# Patient Record
Sex: MALE | Race: BLACK OR AFRICAN AMERICAN | Employment: UNEMPLOYED | URBAN - METROPOLITAN AREA
[De-identification: names, ages, dates, MRNs, and addresses within clinical notes are randomized per-mention and may not be internally consistent; named-entity substitution may affect disease eponyms.]

---

## 2017-01-05 ENCOUNTER — OFFICE VISIT (OUTPATIENT)
Dept: CARDIOLOGY CLINIC | Age: 77
End: 2017-01-05

## 2017-01-05 VITALS
HEART RATE: 96 BPM | OXYGEN SATURATION: 97 % | DIASTOLIC BLOOD PRESSURE: 70 MMHG | SYSTOLIC BLOOD PRESSURE: 107 MMHG | BODY MASS INDEX: 25.12 KG/M2 | WEIGHT: 169.6 LBS | HEIGHT: 69 IN

## 2017-01-05 DIAGNOSIS — I10 ESSENTIAL HYPERTENSION: ICD-10-CM

## 2017-01-05 DIAGNOSIS — I50.810 RIGHT-SIDED HEART FAILURE (HCC): Primary | ICD-10-CM

## 2017-01-05 DIAGNOSIS — R06.02 SOB (SHORTNESS OF BREATH): ICD-10-CM

## 2017-01-05 DIAGNOSIS — I27.20 PULMONARY HTN (HCC): ICD-10-CM

## 2017-01-05 RX ORDER — CLOPIDOGREL BISULFATE 75 MG/1
TABLET ORAL
Refills: 1 | COMMUNITY
Start: 2016-10-17 | End: 2017-01-10

## 2017-01-05 RX ORDER — CARVEDILOL 3.12 MG/1
TABLET ORAL 2 TIMES DAILY WITH MEALS
COMMUNITY
End: 2017-01-10

## 2017-01-05 RX ORDER — GUAIFENESIN 600 MG/1
TABLET, EXTENDED RELEASE ORAL
Refills: 0 | COMMUNITY
Start: 2016-12-22 | End: 2017-01-10 | Stop reason: CLARIF

## 2017-01-05 RX ORDER — ATORVASTATIN CALCIUM 40 MG/1
TABLET, FILM COATED ORAL
Refills: 1 | COMMUNITY
Start: 2016-10-31 | End: 2017-01-10 | Stop reason: CLARIF

## 2017-01-05 RX ORDER — BISMUTH SUBSALICYLATE 262 MG
1 TABLET,CHEWABLE ORAL DAILY
COMMUNITY

## 2017-01-05 RX ORDER — ALLOPURINOL 100 MG/1
TABLET ORAL
Refills: 2 | COMMUNITY
Start: 2016-10-31 | End: 2017-01-10

## 2017-01-05 NOTE — Clinical Note
Jatin Rosen, we sent Mr Jasen Cummings to [facility] today to see Dr. Domingo Blackmon who is a specialist in pulmonary hypertension. We really haven't seen much improvement in Mr. Ev Mcguire dyspnea in the past few visits. I had ordered a chest XR last month, which was normal, and advised a pulmonary consultation, which he has arranged for February. However, he reports a recent hospitalization at [facility] this week -- was discharged yesterday. He reports he had significant swelling in his abdomen; he estimates that he lost 16 lbs with diuresis in the hospital. But never had fluid in his legs or lungs. This is probably all right sided heart failure with pulmonary HTN. His last echo showed PA pressure of 38 mmHg which was deemed an underestimate. We are getting [facility] records and will scan them into the chart. We will keep you informed of what we hear from Dr. Domingo Blackmon. Happy New Year!  Tano Poon PA-C

## 2017-01-05 NOTE — MR AVS SNAPSHOT
Visit Information Date & Time Provider Department Dept. Phone Encounter #  
 1/5/2017 11:00 AM Edilia Lockwood MD [facility] [phone] [id number] Your Appointments 1/10/2017  1:20 PM  
ROUTINE CARE with Darrius Rojas NP  
[address] [facility] [address] [facility]) Appt Note: F/U  
 [address] [place] [address] [place] [address]  
[phone]  
  
   
 [address] [place]  
  
    
 10/4/2017  9:40 AM  
Follow Up with Ronell Bumpers, MD  
[facility] at [facility] [address] [facility]) Appt Note: Follow up $0CP tdb 10/4/16  
 [facility], 
[address] [place] [address], [address] [phone]  
[address], [address] [place] [address], [address] [phone] Upcoming Health Maintenance Date Due Pneumococcal 65+ Low/Medium Risk (1 of 2 - PCV13) 12/22/2006 INFLUENZA AGE 9 TO ADULT 8/1/2016 MEDICARE YEARLY EXAM 11/30/2016 GLAUCOMA SCREENING Q2Y 12/1/2016 COLONOSCOPY 9/9/2020 DTaP/Tdap/Td series (2 - Td) 6/14/2026 Allergies as of 1/5/2017  Review Complete On: 1/5/2017 By: Carole Snider PA-C No Known Allergies Current Immunizations  Reviewed on 9/2/2016 Name Date Influenza High Dose Vaccine PF 12/1/2014 Influenza Vaccine 10/24/2013 Tdap 6/14/2016 Not reviewed this visit You Were Diagnosed With   
  
 Codes Comments Right-sided heart failure ([facility]    -  Primary ICD-10-CM: I50.9 ICD-9-CM: 428.0 SOB (shortness of breath)     ICD-10-CM: R06.02 
ICD-9-CM: 786.05   
 Pulmonary HTN ([facility]     ICD-10-CM: I27.2 ICD-9-CM: 416.8 Essential hypertension     ICD-10-CM: I10 
ICD-9-CM: 401.9 Vitals BP Pulse Height(growth percentile) Weight(growth percentile) SpO2 BMI  
 107/70 96 5' 9\" (1.753 m) 169 lb 9.6 oz (76.9 kg) 97% 25.05 kg/m2 Smoking Status Former Smoker Vitals History BMI and BSA Data Body Mass Index Body Surface Area 25.05 kg/m 2 1.93 m 2 Preferred Pharmacy Pharmacy Name Phone Orly Hendrix [facility], [address] [facility] [phone] Your Updated Medication List  
  
   
This list is accurate as of: 1/5/17 12:23 PM.  Always use your most recent med list.  
  
  
  
  
 acetaminophen 650 mg CR tablet Commonly known as:  TYLENOL ARTHRITIS PAIN Take 1 Tab by mouth daily. Indications: ARTHRITIC PAIN  
  
 allopurinol 100 mg tablet Commonly known as:  Lloyd Needle TK 1 T PO D  
  
 aspirin delayed-release 81 mg tablet TK 1 T PO  QD  
  
 atorvastatin 40 mg tablet Commonly known as:  LIPITOR TK 1 T PO NIGHTLY  
  
 carvedilol 3.125 mg tablet Commonly known as:  Layman Andersen Take  by mouth two (2) times daily (with meals). clopidogrel 75 mg Tab Commonly known as:  PLAVIX TK 1 T PO D  
  
 clotrimazole-betamethasone topical cream  
Commonly known as:  Mandi Mole Apply  to affected area two (2) times a day. Apply to affected area bid/ generic formula  
  
 diclofenac 1 % Gel Commonly known as:  VOLTAREN Apply 4 g to affected area four (4) times daily. furosemide 40 mg tablet Commonly known as:  LASIX  
  
 losartan 50 mg tablet Commonly known as:  COZAAR Take 1 Tab by mouth daily. MUCINEX 600 mg ER tablet Generic drug:  guaiFENesin ER TK 1 T PO BID  
  
 multivitamin tablet Commonly known as:  ONE A DAY Take 1 Tab by mouth daily. omeprazole 40 mg capsule Commonly known as:  PRILOSEC Take 1 Cap by mouth daily. potassium chloride 10 mEq tablet Commonly known as:  K-DUR, KLOR-CON Take 1 Tab by mouth daily. PROAIR HFA IN Take  by inhalation. 2 PUFFS PRN  
  
 tamsulosin 0.4 mg capsule Commonly known as:  FLOMAX TAKE ONE CAPSULE BY MOUTH DAILY Patient Instructions -- We advise a right heart catheterization by a specialist in pulmonary hypertension -- Dr. Niles Borja at [address] [facility] will contact you with instructions -- Please keep us informed as needed Pulmonary Hypertension: Care Instructions Your Care Instructions Pulmonary hypertension is high blood pressure in the arteries of your lungs. These blood vessels carry blood from the heart to the lungs, where the blood picks up oxygen. The walls of the arteries may get thick, and the arteries may get narrow. When this happens, blood does not flow as well as it should. Pressure builds up in the arteries. Then your heart has to work harder to pump blood through your lungs. There are different types of pulmonary hypertension. They are caused by different things. Causes include other health conditions such as heart or lung problems. Sometimes it can happen without a known cause. When you have this condition, your body gets less oxygen from your blood. This causes symptoms such as shortness of breath and feeling tired, faint, or dizzy. Over time, these symptoms may change or get worse if your heart gets weaker. You may get heart failure. Heart failure means your heart doesn't pump as much blood as your body needs. Treatment can help you feel better and live longer. Your treatment options will depend on the type of pulmonary hypertension you have. It can be hard to learn that you have a problem with your lungs and heart. But there are things you can do to feel better and stay as active as you can. Follow-up care is a key part of your treatment and safety. Be sure to make and go to all appointments, and call your doctor if you are having problems. It's also a good idea to know your test results and keep a list of the medicines you take. How can you care for yourself at home? Medicine · Be safe with medicines. Take your medicines exactly as prescribed. Call your doctor if you think you are having a problem with your medicine.  You will get more details on the specific medicines your doctor prescribes. · Your doctor may prescribe oxygen therapy. You will get more details on how to use it. · Talk to your doctor before you take any vitamins, over-the-counter medicine, or herbal products. Don't take ibuprofen (Advil or Motrin) and naproxen (Aleve) without talking to your doctor first. 
· If you take a blood thinner, be sure to get instructions about how to take your medicine safely. Blood thinners can cause serious bleeding problems. Activity · Be as active as you can. Talk to your doctor about making a plan before you start a new activity. · Ask your doctor if a pulmonary rehabilitation program is right for you. · Learn how to save your energy. Making small changes in daily activities can make a big impact on how you feel. Staying healthy · Eat healthy foods, and try to stay at a healthy weight. · Do not smoke. Smoking can make this condition worse. If you need help quitting, talk to your doctor about stop-smoking programs and medicines. These can increase your chances of quitting for good. · Talk to your doctor about preventing pregnancy. You may need to take steps to avoid becoming pregnant. Pregnancy and childbirth can cause changes in the body that could be life-threatening for women who have this condition. · Avoid colds and flu. Get a pneumococcal vaccine shot. If you have had one before, ask your doctor if you need another dose. Get a flu vaccine every year. If you must be around people with colds or flu, wash your hands often. When should you call for help? Call 911 anytime you think you may need emergency care. For example, call if: 
· You have symptoms of sudden heart failure. These may include: ¨ Severe trouble breathing. ¨ A fast or irregular heartbeat. ¨ Coughing up pink, foamy mucus. ¨ Passing out. Call your doctor now or seek immediate medical care if: · You have new or changed symptoms of heart failure, such as: ¨ New or increased shortness of breath. ¨ New or worse swelling in your legs, ankles, or feet. ¨ Sudden weight gain, such as 3 pounds or more in 2 to 3 days. (Your doctor may suggest a different range of weight gain.) ¨ Feeling dizzy or lightheaded or like you may faint. ¨ Feeling so tired or weak that you cannot do your usual activities. ¨ Not sleeping well. Shortness of breath wakes you at night. You need extra pillows to prop yourself up to breathe easier. Watch closely for changes in your health, and be sure to contact your doctor if: 
· You have new or worse symptoms. Where can you learn more? Go to http://jose-bernadine.info/. Enter O743 in the search box to learn more about \"Pulmonary Hypertension: Care Instructions. \" Current as of: January 27, 2016 Content Version: 11.1 © 7851-2294 [employer]. Care instructions adapted under license by [employer] (which disclaims liability or warranty for this information). If you have questions about a medical condition or this instruction, always ask your healthcare professional. [place] [address] any warranty or liability for your use of this information. Introducing [facility]! Ha Singh introduces RealSelf patient portal. Now you can access parts of your medical record, email your doctor's office, and request medication refills online. 1. In your internet browser, go to [email or web address] 2. Click on the First Time User? Click Here link in the Sign In box. You will see the New Member Sign Up page. 3. Enter your RealSelf Access Code exactly as it appears below. You will not need to use this code after youve completed the sign-up process. If you do not sign up before the expiration date, you must request a new code. · RealSelf Access Code: [id number] Expires: 1/23/2017  9:52 AM 
 
 4. Enter the last four digits of your Social Security Number (xxxx) and Date of Birth (mm/dd/yyyy) as indicated and click Submit. You will be taken to the next sign-up page. 5. Create a Ontela ID. This will be your Ontela login ID and cannot be changed, so think of one that is secure and easy to remember. 6. Create a Ontela password. You can change your password at any time. 7. Enter your Password Reset Question and Answer. This can be used at a later time if you forget your password. 8. Enter your e-mail address. You will receive e-mail notification when new information is available in [address] 9. Click Sign Up. You can now view and download portions of your medical record. 10. Click the Download Summary menu link to download a portable copy of your medical information. If you have questions, please visit the Frequently Asked Questions section of the Ontela website. Remember, Ontela is NOT to be used for urgent needs. For medical emergencies, dial 911. Now available from your iPhone and Android! Please provide this summary of care documentation to your next provider. Your primary care clinician is listed as CLAIRE Mueller. If you have any questions after today's visit, please call [phone].

## 2017-01-05 NOTE — PROGRESS NOTES
[facility]   [address] [facility], [address] [place] [address]                                          NEW PATIENT HPI/FOLLOW-UP      NAME:  Quynh Gee   :   1940   MRN:   [id number]   PCP:  Gisella Sullivan NP           Subjective: The patient is a 77y.o. year old male  who returns for a routine follow-up with no change in sx. Since the last visit, patient reports continued SOB despite a 3 day hospital stay at [facility]. Pt was discharged yesterday, 2017. Pt reports a 16-lb weight loss due to fluid in his abdomen that was diuresed during hospital stay. He reports RUQ pain during his stay, which has improved. He denies fluid in LE or lungs. He has a normal chest XR after his last OV with us on 16. Pt states he is continuously fatigued. He cannot attend to his daily activities because of SOB and fatigue. Denies chest pain, edema, medication intolerance, palpitations, PND/orthopnea wheezing, sputum, syncope, dizziness or light headedness. He has made appointment with Pulmonary, but is not able to get in there until February. Review of Systems  General: Pt denies excessive weight gain or loss. Pt is able to conduct ADL's. Respiratory: +shortness of breath, +MCCALLUM, denies wheezing or stridor.   Cardiovascular: Denies precordial pain, palpitations, edema or PND  Gastrointestinal: Denies poor appetite, indigestion, +abdominal pain   Peripheral vascular: Denies claudication, leg cramps  Neuropsychiatric: Denies paresthesias,tingling,numbness,anxiety,depression,fatigue  Musculoskeletal: Denies pain,tenderness, soreness,swelling      Past Medical History   Diagnosis Date    Arthritis     CHF (congestive heart failure) ([facility]     Dizziness 3/3/2011    GERD (gastroesophageal reflux disease)     Hypercholesterolemia     Hypertension     ICD (implantable cardiac defibrillator), single, in situ 3/3/2011    Rectus diastasis 2014    Stroke [facility])      Patient Active Problem List    Diagnosis Date Noted    Stenosis of both internal carotid arteries 10/04/2016    Cerebrovascular accident (CVA) due to occlusion of right middle cerebral artery ([facility] 09/02/2016    Upper GI bleed 03/30/2016    Rectus diastasis 12/11/2014    Anxiety 10/24/2013    Anemia 03/03/2011    Chest discomfort 03/03/2011    Pacemaker 03/03/2011    Dizziness 03/03/2011    Bronchospasm 11/16/2010    CHF (congestive heart failure) ([facility] 08/09/2010    HTN (hypertension) 08/09/2010    SOB (shortness of breath) 08/09/2010    PPD positive 08/09/2010      Past Surgical History   Procedure Laterality Date    Hx orthopaedic       left knee rep 2005, rt kneerepl 2006    Pr cardiac surg procedure unlist       defibulator 2009    Pr egd balloon dilation esophagus <30 mm diam  10/5/2012          Upper gi endoscopy,biopsy  3/31/2016          Colonoscopy,remv lesn,snare  4/1/2016           No Known Allergies   Family History   Problem Relation Age of Onset    Heart Disease Mother     Heart Disease Sister       Social History     Social History    Marital status:      Spouse name: N/A    Number of children: N/A    Years of education: N/A     Occupational History    Not on file. Social History Main Topics    Smoking status: Former Smoker    Smokeless tobacco: Never Used    Alcohol use 1.5 oz/week     3 Cans of beer per week    Drug use: Yes     Special: Marijuana      Comment: twice weekly    Sexual activity: Not on file     Other Topics Concern    Not on file     Social History Narrative    4/2016: lives with son and two grandchildren. Did 1 year of college. Used to work as . Current Outpatient Prescriptions   Medication Sig    carvedilol (COREG) 3.125 mg tablet Take  by mouth two (2) times daily (with meals).  multivitamin (ONE A DAY) tablet Take 1 Tab by mouth daily.  ALBUTEROL SULFATE (PROAIR HFA IN) Take  by inhalation.  2 PUFFS PRN    furosemide (LASIX) 40 mg tablet     aspirin delayed-release 81 mg tablet TK 1 T PO  QD    losartan (COZAAR) 50 mg tablet Take 1 Tab by mouth daily. (Patient taking differently: Take 25 mg by mouth daily. 1/2 TABLET)    diclofenac (VOLTAREN) 1 % gel Apply 4 g to affected area four (4) times daily.  tamsulosin (FLOMAX) 0.4 mg capsule TAKE ONE CAPSULE BY MOUTH DAILY    omeprazole (PRILOSEC) 40 mg capsule Take 1 Cap by mouth daily.  acetaminophen (TYLENOL ARTHRITIS PAIN) 650 mg CR tablet Take 1 Tab by mouth daily. Indications: ARTHRITIC PAIN    clotrimazole-betamethasone (LOTRISONE) topical cream Apply  to affected area two (2) times a day. Apply to affected area bid/ generic formula (Patient taking differently: Apply  to affected area as needed. Apply to affected area bid/ generic formula)    potassium chloride (K-DUR, KLOR-CON) 10 mEq tablet Take 1 Tab by mouth daily.  allopurinol (ZYLOPRIM) 100 mg tablet TK 1 T PO D    atorvastatin (LIPITOR) 40 mg tablet TK 1 T PO NIGHTLY    clopidogrel (PLAVIX) 75 mg tab TK 1 T PO D    MUCINEX 600 mg ER tablet TK 1 T PO BID     No current facility-administered medications for this visit. I have reviewed the MAs notes, vitals, problem list, allergy list, medical history, family medical, social history and medications. Objective:     Physical Exam:     Vitals:    01/05/17 1126   BP: 107/70   Pulse: 96   SpO2: 97%   Weight: 169 lb 9.6 oz (76.9 kg)   Height: 5' 9\" (1.753 m)    Body mass index is 25.05 kg/(m^2). General: WDWN, in no acute distress. HEENT: No carotid bruits, no JVD, trach is midline. Heart:  Normal S1/S2 negative S3 or S4. Regular, no murmur, gallop or rub.   Respiratory: Clear bilaterally, no wheezing or rales. No tachypnea. Abdomen:   Soft, non-tender, bowel sounds are active. Palpable liver margin. Extremities:  No edema, normal cap refill, no cyanosis. Neuro: A&Ox3, speech clear, gait stable.    Skin: Skin color is normal. No rashes or lesions. No diaphoresis. Vascular: 2+ pulses symmetric in all extremities      Data Review:       Cardiographics:    Cardiology Labs: reviewed    Results for orders placed or performed during the hospital encounter of 09/02/16   EKG, 12 LEAD, INITIAL   Result Value Ref Range    Ventricular Rate 81 BPM    Atrial Rate 81 BPM    P-R Interval 140 ms    QRS Duration 100 ms    Q-T Interval 394 ms    QTC Calculation (Bezet) 457 ms    Calculated P Axis 59 degrees    Calculated R Axis -38 degrees    Calculated T Axis 28 degrees    Diagnosis       Sinus rhythm with occasional premature ventricular complexes  Possible Left atrial enlargement  Left axis deviation  T wave abnormality, consider lateral ischemia  Inferior infarct (cited on or before 03-SEP-2016)  When compared with ECG of 22-MAY-2016 18:06,  premature supraventricular complexes are no longer present  T wave abnormality, consider lateral ischemia is a new finding  Confirmed by Aurelia Gaspar ([id number]) on 9/4/2016 2:05:55 PM         Lab Results   Component Value Date/Time    Cholesterol, total 126 09/03/2016 05:13 AM    HDL Cholesterol 44 09/03/2016 05:13 AM    LDL, calculated 68 09/03/2016 05:13 AM    Triglyceride 70 09/03/2016 05:13 AM    CHOL/HDL Ratio 2.9 09/03/2016 05:13 AM       Lab Results   Component Value Date/Time    Sodium 138 09/05/2016 12:57 AM    Potassium 4.0 09/05/2016 12:57 AM    Chloride 103 09/05/2016 12:57 AM    CO2 27 09/05/2016 12:57 AM    Anion gap 8 09/05/2016 12:57 AM    Glucose 89 09/05/2016 12:57 AM    BUN 16 09/05/2016 12:57 AM    Creatinine 1.18 09/05/2016 12:57 AM    BUN/Creatinine ratio 14 09/05/2016 12:57 AM    GFR est AA >60 09/05/2016 12:57 AM    GFR est non-AA >60 09/05/2016 12:57 AM    Calcium 8.7 09/05/2016 12:57 AM    Bilirubin, total 0.5 09/05/2016 12:57 AM    ALT 16 09/05/2016 12:57 AM    AST 13 09/05/2016 12:57 AM    Alk.  phosphatase 77 09/05/2016 12:57 AM    Protein, total 7.4 09/05/2016 12:57 AM    Albumin 3.5 09/05/2016 12:57 AM    Globulin 3.9 09/05/2016 12:57 AM    A-G Ratio 0.9 09/05/2016 12:57 AM          Assessment:       ICD-10-CM ICD-9-CM    1. Right-sided heart failure (HCC) I50.9 428.0    2. SOB (shortness of breath) R06.02 786.05    3. Pulmonary HTN (HCC) I27.2 416.8    4. Essential hypertension I10 401.9          Discussion: Patient presents at this with probable right sided heart failure, pulmonary hypertension, SOB and fatigue despite recent hospitalization. Attending to Attending consultation between Dr. Rosen and Dr. Ghada Perez Pulmonary HTN specialist regarding pt's status. Dr. Peña Sotelo has agreed to contact patient for next steps and will see pt today. Reviewed plan with pt who is in agreement. Outside medical records requested and reviewed. Plan:   Discussed with Dr. Rosen    1. Continue same meds. 2.Encouraged to follow up with Dr. Peña Sotelo    3. Follow up: to be determined after evaluation by Dr. Peña Sotelo    I have discussed the diagnosis with the patient and the intended plan as seen in the above orders. The patient has received an after-visit summary and questions were answered concerning future plans. I have discussed any concerning medication side effects and warnings with the patient as well.     Matias Valencia PA-C  1/5/2017

## 2017-01-05 NOTE — PATIENT INSTRUCTIONS
-- We advise a right heart catheterization by a specialist in pulmonary hypertension  -- Dr. Frederick Negrete at [facility] will contact you with instructions  -- Please keep us informed as needed     Pulmonary Hypertension: Care Instructions  Your Care Instructions  Pulmonary hypertension is high blood pressure in the arteries of your lungs. These blood vessels carry blood from the heart to the lungs, where the blood picks up oxygen. The walls of the arteries may get thick, and the arteries may get narrow. When this happens, blood does not flow as well as it should. Pressure builds up in the arteries. Then your heart has to work harder to pump blood through your lungs. There are different types of pulmonary hypertension. They are caused by different things. Causes include other health conditions such as heart or lung problems. Sometimes it can happen without a known cause. When you have this condition, your body gets less oxygen from your blood. This causes symptoms such as shortness of breath and feeling tired, faint, or dizzy. Over time, these symptoms may change or get worse if your heart gets weaker. You may get heart failure. Heart failure means your heart doesn't pump as much blood as your body needs. Treatment can help you feel better and live longer. Your treatment options will depend on the type of pulmonary hypertension you have. It can be hard to learn that you have a problem with your lungs and heart. But there are things you can do to feel better and stay as active as you can. Follow-up care is a key part of your treatment and safety. Be sure to make and go to all appointments, and call your doctor if you are having problems. It's also a good idea to know your test results and keep a list of the medicines you take. How can you care for yourself at home? Medicine  · Be safe with medicines. Take your medicines exactly as prescribed. Call your doctor if you think you are having a problem with your medicine.  You will get more details on the specific medicines your doctor prescribes. · Your doctor may prescribe oxygen therapy. You will get more details on how to use it. · Talk to your doctor before you take any vitamins, over-the-counter medicine, or herbal products. Don't take ibuprofen (Advil or Motrin) and naproxen (Aleve) without talking to your doctor first.  · If you take a blood thinner, be sure to get instructions about how to take your medicine safely. Blood thinners can cause serious bleeding problems. Activity  · Be as active as you can. Talk to your doctor about making a plan before you start a new activity. · Ask your doctor if a pulmonary rehabilitation program is right for you. · Learn how to save your energy. Making small changes in daily activities can make a big impact on how you feel. Staying healthy  · Eat healthy foods, and try to stay at a healthy weight. · Do not smoke. Smoking can make this condition worse. If you need help quitting, talk to your doctor about stop-smoking programs and medicines. These can increase your chances of quitting for good. · Talk to your doctor about preventing pregnancy. You may need to take steps to avoid becoming pregnant. Pregnancy and childbirth can cause changes in the body that could be life-threatening for women who have this condition. · Avoid colds and flu. Get a pneumococcal vaccine shot. If you have had one before, ask your doctor if you need another dose. Get a flu vaccine every year. If you must be around people with colds or flu, wash your hands often. When should you call for help? Call 911 anytime you think you may need emergency care. For example, call if:  · You have symptoms of sudden heart failure. These may include:  ¨ Severe trouble breathing. ¨ A fast or irregular heartbeat. ¨ Coughing up pink, foamy mucus. ¨ Passing out.   Call your doctor now or seek immediate medical care if:  · You have new or changed symptoms of heart failure, such asRoxine Cook New or increased shortness of breath. ¨ New or worse swelling in your legs, ankles, or feet. ¨ Sudden weight gain, such as 3 pounds or more in 2 to 3 days. (Your doctor may suggest a different range of weight gain.)  ¨ Feeling dizzy or lightheaded or like you may faint. ¨ Feeling so tired or weak that you cannot do your usual activities. ¨ Not sleeping well. Shortness of breath wakes you at night. You need extra pillows to prop yourself up to breathe easier. Watch closely for changes in your health, and be sure to contact your doctor if:  · You have new or worse symptoms. Where can you learn more? Go to http://jose-bernadine.info/. Enter O770 in the search box to learn more about \"Pulmonary Hypertension: Care Instructions. \"  Current as of: January 27, 2016  Content Version: 11.1  © 3561-6270 [employer]. Care instructions adapted under license by [employer] (which disclaims liability or warranty for this information). If you have questions about a medical condition or this instruction, always ask your healthcare professional. [place] [address] any warranty or liability for your use of this information.

## 2017-01-10 ENCOUNTER — APPOINTMENT (OUTPATIENT)
Dept: GENERAL RADIOLOGY | Age: 77
DRG: 292 | End: 2017-01-10
Attending: PHYSICIAN ASSISTANT
Payer: MEDICARE

## 2017-01-10 ENCOUNTER — OFFICE VISIT (OUTPATIENT)
Dept: INTERNAL MEDICINE CLINIC | Age: 77
End: 2017-01-10

## 2017-01-10 ENCOUNTER — HOSPITAL ENCOUNTER (INPATIENT)
Age: 77
LOS: 1 days | Discharge: HOME | DRG: 292 | End: 2017-01-12
Attending: EMERGENCY MEDICINE | Admitting: STUDENT IN AN ORGANIZED HEALTH CARE EDUCATION/TRAINING PROGRAM
Payer: MEDICARE

## 2017-01-10 ENCOUNTER — TELEPHONE (OUTPATIENT)
Dept: CARDIOLOGY CLINIC | Age: 77
End: 2017-01-10

## 2017-01-10 VITALS
DIASTOLIC BLOOD PRESSURE: 63 MMHG | WEIGHT: 171.4 LBS | HEART RATE: 88 BPM | TEMPERATURE: 97.9 F | BODY MASS INDEX: 25.39 KG/M2 | RESPIRATION RATE: 18 BRPM | SYSTOLIC BLOOD PRESSURE: 85 MMHG | HEIGHT: 69 IN | OXYGEN SATURATION: 96 %

## 2017-01-10 DIAGNOSIS — I95.9 HYPOTENSION, UNSPECIFIED HYPOTENSION TYPE: Primary | ICD-10-CM

## 2017-01-10 DIAGNOSIS — E86.0 DEHYDRATION: ICD-10-CM

## 2017-01-10 DIAGNOSIS — I27.20 PULMONARY HTN (HCC): ICD-10-CM

## 2017-01-10 DIAGNOSIS — R06.02 SOB (SHORTNESS OF BREATH): ICD-10-CM

## 2017-01-10 DIAGNOSIS — K11.7 XEROSTOMIA: ICD-10-CM

## 2017-01-10 LAB
ALBUMIN SERPL BCP-MCNC: 3.5 G/DL (ref 3.5–5)
ALBUMIN/GLOB SERPL: 0.9 {RATIO} (ref 1.1–2.2)
ALP SERPL-CCNC: 68 U/L (ref 45–117)
ALT SERPL-CCNC: 25 U/L (ref 12–78)
ANION GAP BLD CALC-SCNC: 8 MMOL/L (ref 5–15)
AST SERPL W P-5'-P-CCNC: 20 U/L (ref 15–37)
ATRIAL RATE: 87 BPM
BASOPHILS # BLD AUTO: 0 K/UL (ref 0–0.1)
BASOPHILS # BLD: 0 % (ref 0–1)
BILIRUB SERPL-MCNC: 0.6 MG/DL (ref 0.2–1)
BNP SERPL-MCNC: 1857 PG/ML (ref 0–450)
BUN SERPL-MCNC: 24 MG/DL (ref 6–20)
BUN/CREAT SERPL: 16 (ref 12–20)
CALCIUM SERPL-MCNC: 8.5 MG/DL (ref 8.5–10.1)
CALCULATED P AXIS, ECG09: 50 DEGREES
CALCULATED R AXIS, ECG10: -42 DEGREES
CALCULATED T AXIS, ECG11: 103 DEGREES
CHLORIDE SERPL-SCNC: 107 MMOL/L (ref 97–108)
CO2 SERPL-SCNC: 28 MMOL/L (ref 21–32)
CREAT SERPL-MCNC: 1.46 MG/DL (ref 0.7–1.3)
DIAGNOSIS, 93000: NORMAL
EOSINOPHIL # BLD: 0.2 K/UL (ref 0–0.4)
EOSINOPHIL NFR BLD: 4 % (ref 0–7)
ERYTHROCYTE [DISTWIDTH] IN BLOOD BY AUTOMATED COUNT: 14.3 % (ref 11.5–14.5)
GLOBULIN SER CALC-MCNC: 3.7 G/DL (ref 2–4)
GLUCOSE SERPL-MCNC: 103 MG/DL (ref 65–100)
HCT VFR BLD AUTO: 37.7 % (ref 36.6–50.3)
HGB BLD-MCNC: 12.3 G/DL (ref 12.1–17)
LYMPHOCYTES # BLD AUTO: 50 % (ref 12–49)
LYMPHOCYTES # BLD: 2.6 K/UL (ref 0.8–3.5)
MCH RBC QN AUTO: 30.4 PG (ref 26–34)
MCHC RBC AUTO-ENTMCNC: 32.6 G/DL (ref 30–36.5)
MCV RBC AUTO: 93.3 FL (ref 80–99)
MONOCYTES # BLD: 0.7 K/UL (ref 0–1)
MONOCYTES NFR BLD AUTO: 13 % (ref 5–13)
NEUTS SEG # BLD: 1.7 K/UL (ref 1.8–8)
NEUTS SEG NFR BLD AUTO: 33 % (ref 32–75)
P-R INTERVAL, ECG05: 142 MS
PLATELET # BLD AUTO: 219 K/UL (ref 150–400)
POTASSIUM SERPL-SCNC: 4.4 MMOL/L (ref 3.5–5.1)
PROT SERPL-MCNC: 7.2 G/DL (ref 6.4–8.2)
Q-T INTERVAL, ECG07: 404 MS
QRS DURATION, ECG06: 108 MS
QTC CALCULATION (BEZET), ECG08: 486 MS
RBC # BLD AUTO: 4.04 M/UL (ref 4.1–5.7)
SODIUM SERPL-SCNC: 143 MMOL/L (ref 136–145)
VENTRICULAR RATE, ECG03: 87 BPM
WBC # BLD AUTO: 5.1 K/UL (ref 4.1–11.1)

## 2017-01-10 PROCEDURE — 74011250637 HC RX REV CODE- 250/637: Performed by: INTERNAL MEDICINE

## 2017-01-10 PROCEDURE — 99218 HC RM OBSERVATION: CPT

## 2017-01-10 PROCEDURE — 36415 COLL VENOUS BLD VENIPUNCTURE: CPT | Performed by: PHYSICIAN ASSISTANT

## 2017-01-10 PROCEDURE — 85025 COMPLETE CBC W/AUTO DIFF WBC: CPT | Performed by: PHYSICIAN ASSISTANT

## 2017-01-10 PROCEDURE — 71020 XR CHEST PA LAT: CPT

## 2017-01-10 PROCEDURE — 99285 EMERGENCY DEPT VISIT HI MDM: CPT

## 2017-01-10 PROCEDURE — 74011250636 HC RX REV CODE- 250/636: Performed by: PHYSICIAN ASSISTANT

## 2017-01-10 PROCEDURE — 96360 HYDRATION IV INFUSION INIT: CPT

## 2017-01-10 PROCEDURE — 83880 ASSAY OF NATRIURETIC PEPTIDE: CPT | Performed by: PHYSICIAN ASSISTANT

## 2017-01-10 PROCEDURE — 93005 ELECTROCARDIOGRAM TRACING: CPT

## 2017-01-10 PROCEDURE — 74011250636 HC RX REV CODE- 250/636: Performed by: INTERNAL MEDICINE

## 2017-01-10 PROCEDURE — 80053 COMPREHEN METABOLIC PANEL: CPT | Performed by: PHYSICIAN ASSISTANT

## 2017-01-10 RX ORDER — FUROSEMIDE 40 MG/1
40 TABLET ORAL 2 TIMES DAILY
COMMUNITY

## 2017-01-10 RX ORDER — SODIUM CHLORIDE 0.9 % (FLUSH) 0.9 %
5-10 SYRINGE (ML) INJECTION EVERY 8 HOURS
Status: DISCONTINUED | OUTPATIENT
Start: 2017-01-10 | End: 2017-01-12 | Stop reason: HOSPADM

## 2017-01-10 RX ORDER — PANTOPRAZOLE SODIUM 40 MG/1
40 TABLET, DELAYED RELEASE ORAL
Status: DISCONTINUED | OUTPATIENT
Start: 2017-01-10 | End: 2017-01-12 | Stop reason: HOSPADM

## 2017-01-10 RX ORDER — GUAIFENESIN 100 MG/5ML
81 LIQUID (ML) ORAL DAILY
Status: DISCONTINUED | OUTPATIENT
Start: 2017-01-11 | End: 2017-01-10

## 2017-01-10 RX ORDER — ALLOPURINOL 100 MG/1
100 TABLET ORAL DAILY
Status: DISCONTINUED | OUTPATIENT
Start: 2017-01-11 | End: 2017-01-12 | Stop reason: HOSPADM

## 2017-01-10 RX ORDER — CARVEDILOL 3.12 MG/1
3.12 TABLET ORAL 2 TIMES DAILY WITH MEALS
COMMUNITY
End: 2017-01-12

## 2017-01-10 RX ORDER — GUAIFENESIN 100 MG/5ML
81 LIQUID (ML) ORAL DAILY
Status: DISCONTINUED | OUTPATIENT
Start: 2017-01-10 | End: 2017-01-12 | Stop reason: HOSPADM

## 2017-01-10 RX ORDER — ALLOPURINOL 100 MG/1
100 TABLET ORAL DAILY
Status: ON HOLD | COMMUNITY
End: 2017-01-11

## 2017-01-10 RX ORDER — LOSARTAN POTASSIUM 50 MG/1
25 TABLET ORAL DAILY
COMMUNITY
End: 2017-01-12

## 2017-01-10 RX ORDER — GUAIFENESIN 100 MG/5ML
81 LIQUID (ML) ORAL DAILY
COMMUNITY
End: 2017-01-16 | Stop reason: SDUPTHER

## 2017-01-10 RX ORDER — SODIUM CHLORIDE 0.9 % (FLUSH) 0.9 %
5-10 SYRINGE (ML) INJECTION AS NEEDED
Status: DISCONTINUED | OUTPATIENT
Start: 2017-01-10 | End: 2017-01-12 | Stop reason: HOSPADM

## 2017-01-10 RX ORDER — ENOXAPARIN SODIUM 100 MG/ML
40 INJECTION SUBCUTANEOUS EVERY 24 HOURS
Status: DISCONTINUED | OUTPATIENT
Start: 2017-01-10 | End: 2017-01-12 | Stop reason: HOSPADM

## 2017-01-10 RX ADMIN — ENOXAPARIN SODIUM 40 MG: 40 INJECTION, SOLUTION INTRAVENOUS; SUBCUTANEOUS at 19:07

## 2017-01-10 RX ADMIN — ASPIRIN 81 MG 81 MG: 81 TABLET ORAL at 19:07

## 2017-01-10 RX ADMIN — PANTOPRAZOLE SODIUM 40 MG: 40 TABLET, DELAYED RELEASE ORAL at 19:07

## 2017-01-10 RX ADMIN — SODIUM CHLORIDE 250 ML: 900 INJECTION, SOLUTION INTRAVENOUS at 16:01

## 2017-01-10 NOTE — TELEPHONE ENCOUNTER
Call was returned. Patient stated no one never contacted him for an appointment regarding procedure to go into the left side of heart due to an EF. Of 30%\". Patient stated he's being seen as of now in the ED at [facility], for Low BP.  Patient stated he has an appointment to see Dr. Trinity Garner at [facility] on 2/2/17

## 2017-01-10 NOTE — IP AVS SNAPSHOT
Agnes Augustin 
 
 
 [address] Patient: Michael Evans MRN: [id number] You are allergic to the following No active allergies Recent Documentation Height Weight BMI Smoking Status 1.753 m 76.7 kg 24.99 kg/m2 Former Smoker Emergency Contacts Name Discharge Info Relation Home Work Mobile Yury Mcghee DISCHARGE CAREGIVER [3] Son [22] [phone] Valere Begun  Sister [23] [phone] Lenoard Freeze  Daughter [21] [phone] Dru Pair  Child [2] [phone] About your hospitalization You were admitted on:  January 10, 2017 You last received care in the:  [address] You were discharged on:  January 12, 2017 Why you were hospitalized Your primary diagnosis was:  Acute On Chronic Systolic Congestive Heart Failure (Hcc) Your diagnoses also included:  Pulmonary Hypertension (Hcc) Providers Seen During Your Hospitalizations Provider Role Specialty Primary office phone Roxanne Rice MD Attending Provider Emergency Medicine [phone] Marisa Lyman MD Attending Provider Emergency Medicine [phone] Vipul Geogre MD Attending Provider Hospitalist [phone] Lena Maier MD Attending Provider Internal Medicine [phone] Your Primary Care Physician (PCP) Primary Care Physician Office Phone Office Fax Armaan Solomon I [phone] Follow-up Information Follow up With Details Comments Contact Info [facility] on [address] [facility]. will be contacted by a health  to schedule a home visit. [address] Moreira MD  You are scheduled to see Dr. Dulce Li on 1/16/17 at 2:40 pm. [address] [facility] [address] 
[phone] Raf Berkowitz NP Go on 1/17/2017 please go to your follow up appointment at 2:[address] [place] [address] [facility] [place] [address] [id number] 
[phone] René Judge MD  You are scheduled to see Dr. René Judge at the [address] [facility] on 1/23/17 at 9:45 am.  Please arrive 30 minutes early with a picture ID and your insurance card. [phone] [place] [address] [address] 
[phone] Your Appointments Monday January 16, 2017  2:40 PM EST  
ESTABLISHED PATIENT with Anne Moreira MD  
[facility]) [address] [phone] Tuesday January 17, 2017  2:20 PM EST  
ROUTINE CARE with Raf Berkowitz NP  
[address] [facility] [address] [facility] [place] [address] [address]  
[phone] Current Discharge Medication List  
  
CONTINUE these medications which have NOT CHANGED Dose & Instructions Dispensing Information Comments Morning Noon Evening Bedtime  
 acetaminophen 650 mg CR tablet Commonly known as:  TYLENOL ARTHRITIS PAIN Your next dose is: Today, Tomorrow Other:  _________ Dose:  650 mg Take 1 Tab by mouth daily. Indications: ARTHRITIC PAIN Quantity:  30 Tab Refills:  11  
     
   
   
   
  
 aspirin 81 mg chewable tablet Your next dose is: Today, Tomorrow Other:  _________ Dose:  81 mg Take 81 mg by mouth daily. Refills:  0  
     
   
   
   
  
 furosemide 40 mg tablet Commonly known as:  LASIX Your next dose is: Today, Tomorrow Other:  _________ Dose:  40 mg Take 40 mg by mouth daily. Refills:  0  
     
   
   
   
  
 multivitamin tablet Commonly known as:  ONE A DAY Your next dose is: Today, Tomorrow Other:  _________ Dose:  1 Tab Take 1 Tab by mouth daily. Refills:  0  
     
   
   
   
  
 omeprazole 40 mg capsule Commonly known as:  PRILOSEC Your next dose is: Today, Tomorrow Other:  _________ Dose:  40 mg Take 1 Cap by mouth daily. Quantity:  90 Cap Refills:  3  
     
   
   
   
  
 potassium chloride 10 mEq tablet Commonly known as:  K-DUR, KLOR-CON Your next dose is: Today, Tomorrow Other:  _________ Dose:  10 mEq Take 1 Tab by mouth daily. Quantity:  90 Tab Refills:  3  
 **Patient requests 90 days supply**  
    
   
   
   
  
 tamsulosin 0.4 mg capsule Commonly known as:  FLOMAX Your next dose is: Today, Tomorrow Other:  _________ TAKE ONE CAPSULE BY MOUTH DAILY Quantity:  90 Cap Refills:  3  
 **Patient requests 90 days supply** STOP taking these medications   
 carvedilol 3.125 mg tablet Commonly known as:  COREG  
   
  
 diclofenac 1 % Gel Commonly known as:  VOLTAREN  
   
  
 losartan 50 mg tablet Commonly known as:  COZAAR Discharge Instructions Low Blood Pressure: Care Instructions Your Care Instructions Blood pressure is a measurement of the force of the blood against the walls of the blood vessels during and after each beat of the heart. Low blood pressure (hypotension) means that your blood pressure is much lower than normal. Some people, especially young, slim women, may have slightly low blood pressure without symptoms. However, in many people, low blood pressure can cause symptoms such as dizziness or lightheadedness. When your blood pressure is too low, your heart, brain, and other organs do not get enough blood. Low blood pressure can be caused by many things, including heart problems and some medicines. Uncontrolled diabetes can cause your blood pressure to drop, and so can a severe allergic reaction or infection.  Another cause is dehydration, which is when your body loses too much fluid. Treatment for low blood pressure depends on the cause. Follow-up care is a key part of your treatment and safety. Be sure to make and go to all appointments, and call your doctor if you are having problems. It's also a good idea to know your test results and keep a list of the medicines you take. How can you care for yourself at home? · Drink plenty of fluids, enough so that your urine is light yellow or clear like water. If you have kidney, heart, or liver disease and have to limit fluids, talk with your doctor before you increase the amount of fluids you drink. · Be safe with medicines. Call your doctor if you think you are having a problem with your medicine. You will get more details on the specific medicines your doctor prescribes. · Stand up or get out of bed very slowly to allow your body to adjust. 
· Get plenty of rest. 
· Do not smoke. Smoking increases your risk of heart attack. If you need help quitting, talk to your doctor about stop-smoking programs and medicines. These can increase your chances of quitting for good. · Limit alcohol to 2 drinks a day for men and 1 drink a day for women. Alcohol may interfere with your medicine. In addition, alcohol can make your low blood pressure worse by causing your body to lose water. When should you call for help? Call 911 anytime you think you may need emergency care. For example, call if: 
· You have symptoms of a heart attack. These may include: ¨ Chest pain or pressure, or a strange feeling in the chest. 
¨ Sweating. ¨ Shortness of breath. ¨ Nausea or vomiting. ¨ Pain, pressure, or a strange feeling in the back, neck, jaw, or upper belly or in one or both shoulders or arms. ¨ Lightheadedness or sudden weakness. ¨ A fast or irregular heartbeat. After you call 911, the  may tell you to chew 1 adult-strength or 2 to 4 low-dose aspirin. Wait for an ambulance.  Do not try to drive yourself. · You have symptoms of a stroke. These may include: 
¨ Sudden numbness, tingling, weakness, or loss of movement in your face, arm, or leg, especially on only one side of your body. ¨ Sudden vision changes. ¨ Sudden trouble speaking. ¨ Sudden confusion or trouble understanding simple statements. ¨ Sudden problems with walking or balance. ¨ A sudden, severe headache that is different from past headaches. · You passed out (lost consciousness). Call your doctor now or seek immediate medical care if: 
· You are dizzy or lightheaded, or you feel like you may faint. · You have signs of needing more fluids. You have sunken eyes and a dry mouth, and you pass only a little dark urine. · You cannot keep down fluids. Watch closely for changes in your health, and be sure to contact your doctor if: 
· You do not get better as expected. Where can you learn more? Go to http://jose-bernadine.info/. Enter C304 in the search box to learn more about \"Low Blood Pressure: Care Instructions. \" Current as of: April 21, 2016 Content Version: 11.1 © 0694-3514 [employer]ico [employer]. Care instructions adapted under license by Usability[employer] (which disclaims liability or warranty for this information). If you have questions about a medical condition or this instruction, always ask your healthcare professional. [address] any warranty or liability for your use of this information. Discharge Instructions Attachments/References HEART FAILURE (ENGLISH) HEART FAILURE: GENERAL INFO (ENGLISH) Discharge Orders None General Information Please provide this summary of care documentation to your next provider. Introducing [facility]! Stepan Oro introduces WinFreeCandy patient portal. Now you can access parts of your medical record, email your doctor's office, and request medication refills online.    
 
1. In your internet browser, go to [email or web address] 2. Click on the First Time User? Click Here link in the Sign In box. You will see the New Member Sign Up page. 3. Enter your NPM Access Code exactly as it appears below. You will not need to use this code after youve completed the sign-up process. If you do not sign up before the expiration date, you must request a new code. · NPM Access Code: [id number] Expires: 1/23/2017  9:52 AM 
 
4. Enter the last four digits of your Social Security Number (xxxx) and Date of Birth (mm/dd/yyyy) as indicated and click Submit. You will be taken to the next sign-up page. 5. Create a NPM ID. This will be your NPM login ID and cannot be changed, so think of one that is secure and easy to remember. 6. Create a NPM password. You can change your password at any time. 7. Enter your Password Reset Question and Answer. This can be used at a later time if you forget your password. 8. Enter your e-mail address. You will receive e-mail notification when new information is available in [address] 9. Click Sign Up. You can now view and download portions of your medical record. 10. Click the Download Summary menu link to download a portable copy of your medical information. If you have questions, please visit the Frequently Asked Questions section of the NPM website. Remember, NPM is NOT to be used for urgent needs. For medical emergencies, dial 911. Now available from your iPhone and Android! Patient Signature:  ____________________________________________________________ Date:  ____________________________________________________________  
  
Florence Domínguez Provider Signature:  ____________________________________________________________ Date:  ____________________________________________________________ More Information Heart Failure: Care Instructions Your Care Instructions Heart failure occurs when your heart does not pump as much blood as the body needs. Failure does not mean that the heart has stopped pumping but rather that it is not pumping as well as it should. Over time, this causes fluid buildup in your lungs and other parts of your body. Fluid buildup can cause shortness of breath, fatigue, swollen ankles, and other problems. By taking medicines regularly, reducing sodium (salt) in your diet, checking your weight every day, and making lifestyle changes, you can feel better and live longer. Follow-up care is a key part of your treatment and safety. Be sure to make and go to all appointments, and call your doctor if you are having problems. It's also a good idea to know your test results and keep a list of the medicines you take. How can you care for yourself at home? Medicines · Be safe with medicines. Take your medicines exactly as prescribed. Call your doctor if you think you are having a problem with your medicine. · Do not take any vitamins, over-the-counter medicine, or herbal products without talking to your doctor first. [place] not take ibuprofen (Advil or Motrin) and naproxen (Aleve) without talking to your doctor first. They could make your heart failure worse. · You may be taking some of the following medicine. ¨ Beta-blockers can slow heart rate, decrease blood pressure, and improve your condition. Taking a beta-blocker may lower your chance of needing to be hospitalized. ¨ Angiotensin-converting enzyme inhibitors (ACEIs) reduce the heart's workload, lower blood pressure, and reduce swelling. Taking an ACEI may lower your chance of needing to be hospitalized again. ¨ Angiotensin II receptor blockers (ARBs) work like ACEIs. Your doctor may prescribe them instead of ACEIs. ¨ Diuretics, also called water pills, reduce swelling. ¨ Potassium supplements replace this important mineral, which is sometimes lost with diuretics. ¨ Aspirin and other blood thinners prevent blood clots, which can cause a stroke or heart attack. You will get more details on the specific medicines your doctor prescribes. Diet · Your doctor may suggest that you limit sodium to 2,000 milligrams (mg) a day or less. That is less than 1 teaspoon of salt a day, including all the salt you eat in cooking or in packaged foods. People get most of their sodium from processed foods. Fast food and restaurant meals also tend to be very high in sodium. · Ask your doctor how much liquid you can drink each day. You may have to limit liquids. Weight · Weigh yourself without clothing at the same time each day. Record your weight. Call your doctor if you gain more than 3 pounds in 2 to 3 days. A sudden weight gain may mean that your heart failure is getting worse. Activity level · Start light exercise (if your doctor says it is okay). Even if you can only do a small amount, exercise will help you get stronger, have more energy, and manage your weight and your stress. Walking is an easy way to get exercise. Start out by walking a little more than you did before. Bit by bit, increase the amount you walk. · When you exercise, watch for signs that your heart is working too hard. You are pushing yourself too hard if you cannot talk while you are exercising. If you become short of breath or dizzy or have chest pain, stop, sit down, and rest. 
· If you feel \"wiped out\" the day after you exercise, walk slower or for a shorter distance until you can work up to a better pace. · Get enough rest at night. Sleeping with 1 or 2 pillows under your upper body and head may help you breathe easier. Lifestyle changes · Do not smoke. Smoking can make a heart condition worse. If you need help quitting, talk to your doctor about stop-smoking programs and medicines. These can increase your chances of quitting for good.  Quitting smoking may be the most important step you can take to protect your heart. · Limit alcohol to 2 drinks a day for men and 1 drink a day for women. Too much alcohol can cause health problems. · Avoid getting sick from colds and the flu. Get a pneumococcal vaccine shot. If you have had one before, ask your doctor whether you need another dose. Get a flu shot each year. If you must be around people with colds or the flu, wash your hands often. When should you call for help? Call 911 if you have symptoms of sudden heart failure such as: 
· You have severe trouble breathing. · You cough up pink, foamy mucus. · You have a new irregular or rapid heartbeat. Call your doctor now or seek immediate medical care if: 
· You have new or increased shortness of breath. · You are dizzy or lightheaded, or you feel like you may faint. · You have sudden weight gain, such as 3 pounds or more in 2 to 3 days. · You have increased swelling in your legs, ankles, or feet. · You are suddenly so tired or weak that you cannot do your usual activities. Watch closely for changes in your health, and be sure to contact your doctor if: 
· You develop new symptoms. Where can you learn more? Go to http://jose-bernadine.info/. Enter P963 in the search box to learn more about \"Heart Failure: Care Instructions. \" Current as of: January 27, 2016 Content Version: 11.1 © 0144-8416 [employer]. Care instructions adapted under license by [employer] (which disclaims liability or warranty for this information). If you have questions about a medical condition or this instruction, always ask your healthcare professional. [place] [address] any warranty or liability for your use of this information. Learning About Heart Failure What is heart failure?  
 
Heart failure means that your heart muscle does not pump as much blood as your body needs. Failure does not mean that your heart has stopped. It means that your heart is not pumping as well as it should. Your body has an amazing ability to make up for heart failure. It may do such a good job that you don't know you have a disease. But at some point, your heart and body will no longer be able to keep up. Then fluid starts to build up in your lungs and other parts of your body. What can you expect when you have heart failure? Heart failure is a lifelong (chronic) disease. Treatment may be able to slow the disease and help you feel better. But heart failure tends to get worse over time. Despite this, there are many steps you can take to feel better and stay healthy longer. Early on, your symptoms may not be too bad. As heart failure gets worse, symptoms typically get worse, and you may need to limit your activities. Heart failure can also get worse suddenly. If this happens, you need emergency care. Then, after treatment, your symptoms may go back to being stable (which means they stay the same) for a long time. Heart failure can lead to other health problems, such as heart rhythm problems. Over time, your treatment options may change, especially as your symptoms get worse. As heart failure gets worse, palliative care can help improve the quality of your life. You can do advance care planning to decide what kind of care you want at the end of your life. What are the symptoms? Symptoms of heart failure start to happen when your heart can't pump enough blood to the rest of your body. In the early stages of heart failure, you may: · Feel tired easily. · Be short of breath when you exert yourself. · Feel like your heart is pounding or racing (palpitations). · Feel weak or dizzy. As heart failure gets worse, fluid starts to build up in your lungs and other parts of your body. This may cause you to: · Feel short of breath even at rest. 
 · Have swelling (edema), especially in your legs, ankles, and feet. · Gain weight. This may happen over just a day or two, or more slowly. · Cough or wheeze, especially when you lie down. How is heart failure treated? · You'll probably take several medicines. · You might attend cardiac rehabilitation (rehab) to get education and support that help you make lifestyle changes and stay as healthy as possible. · You may get a heart device. A pacemaker helps your heart pump blood. An ICD can stop abnormal heart rhythms. How can you care for yourself? There are many steps you can take to feel better and stay healthy longer. These steps are an important part of treatment. They can help you stay active and enjoy life. · Take your medicine the right way. Avoid medicines that can make your symptoms worse. · Check your weight and symptoms every day. Know what to do if your symptoms get worse. · Limit sodium to help your heart pump blood better. · Be active. Exercise regularly, but don't exercise too hard. · Be heart-healthy. Eat healthy foods, stay at a healthy weight, limit alcohol, and don't smoke. · Stay as healthy as possible. Avoid colds and flu, get help for depression and anxiety, and manage stress. Follow-up care is a key part of your treatment and safety. Be sure to make and go to all appointments, and call your doctor if you are having problems. It's also a good idea to know your test results and keep a list of the medicines you take. Where can you learn more? Go to http://jose-bernadine.info/. Enter M837 in the search box to learn more about \"Learning About Heart Failure. \" Current as of: January 27, 2016 Content Version: 11.1 © 8153-2131 [employer]ibaba. Care instructions adapted under license by Owingo (which disclaims liability or warranty for this information).  If you have questions about a medical condition or this instruction, always ask your healthcare professional. [place] [address] any warranty or liability for your use of this information.

## 2017-01-10 NOTE — CONSULTS
Cardiology consultation:    Mr. Jakub Avitia is a 67-year-old male who was referred to the emergency room by his primary care provider because of severe hypotension. He has been feeling lightheaded and at baseline he has considerable shortness of breath. He is followed in our ambulatory cardiology clinic. His dyspnea has persisted despite recent massive diuresis. He is dyspneic on exertion without chest pain. He also has fatigue but he denies orthopnea. He is actually fairly comfortable supine. On his last echocardiogram, his left ventricular ejection fraction was about 30% with severe diffuse hypokinesis. There was concentric and eccentric left ventricular hypertrophy without any sign of outflow tract obstruction. Pulmonary artery pressure was estimated at 38 mm with this likely being a severe underestimate because of right ventricular failure. He had been referred to Dr. Mervat GORMAN for consideration of pulmonary hypertension with drug challenge if appropriate. This evaluation has not been completed so far. At present, after a fluid bolus in the emergency room, his blood pressure is over 942 mm systolic and he is comfortable at 30° torso elevation. He does not have JVD at initial presentation. On auscultation, lungs are clear, heart sounds are soft with the loudest audible sound being the P2. There may be a slight diastolic murmur along the left sternal border. Lowest blood pressure that was recorded was 75/41, presently it is 92/60. There is no peripheral edema, there is no sign of DVT. Liver is mildly enlarged by percussion, abdomen is otherwise unremarkable. Twelve-lead electrocardiogram shows sinus rhythm with probable left atrial conduction abnormality and left axis deviation. There is anterolateral T-wave inversion in a pattern that could suggest ischemia or strain. There are occasional VPCs with some dimorphic pairing. Hemoglobin is 12.3 with normocytic indices.   Lymphocyte count is proportionately elevated at 50% with normal WBC. BUN is 24 with creatinine of 1.46. NT proBNP is 1857. Chest x-ray shows a prominent left ventricle, ICD in situ,          he also has a surprising amount of gynecomastia.     Impression: Congestive cardiomyopathy affecting the left ventricle, stable 30% left ventricular ejection fraction    pulmonary hypertension, exact severity unknown since the right ventricle is very hypokinetic    Hypotension possibly as a consequence of overdiuresis, occurring without left-sided failure    Plan:  Dr. Tyrese Evans was contact regarding possible transfer for cardiac catheterization right and left side    Hydrate only as needed to keep blood pressure above 90 systolic    Hold vasodilators at this time but please continue Coreg    Thank you very much for this consultation    Tan Gutiérrez

## 2017-01-10 NOTE — PROGRESS NOTES
1818hrs Received report from [facility] (ER). 1830hrs Received patient from ED. Patient was alert and oriented, no complaint of pain or distress. 1910hrs . Alondra Newton Bedside and Verbal shift change report given to Tamra Marie (oncoming nurse) by Amol Rodriguez (offgoing nurse). Report included the following information SBAR, Kardex, Intake/Output, MAR, Accordion, Med Rec Status and Cardiac Rhythm NSR, PVC.

## 2017-01-10 NOTE — Clinical Note
Hey pt seen in office today. BP low <80/50's. Sent to ER and told to HOLD lasix, coreg, and losartan until he returns on Thursday. Any objections or alternate treatment suggestions?

## 2017-01-10 NOTE — PROGRESS NOTES
Cardiology:    Mr. Pandora Curling has severe pulmonary hypertension. I had initiated referral to [facility] in [facility] for invasive pulmonary hypertension evaluation and drug trial if appropriate but communications broke down. Hypotension is likely related to right heart failure and IV fluid support is appropriate. Best management would be to attempt transfer to Dr. Linh frances in [facility] rather than admit to any [facility] facility. [facility] is no longer able to offer invasive pulmonary hypertension management. I will contact Dr. Rl Celeste and let him know the patient is in our ED.     Larry Duarte MD [facility]

## 2017-01-10 NOTE — TELEPHONE ENCOUNTER
PATIENT CALLED PLEASE RETURN PT CALL ASAP CONCERNING A TEST NEED TO BE DONE DID NOT GET A CALL STATED DR. BRADFORD WILL HANDLE IT.

## 2017-01-10 NOTE — PATIENT INSTRUCTIONS
You need to report directly to the ER for IV fluids to help RAISE YOUR BLOOD PRESSURE.    STOP BOTH Coreg and Losartan until you return on Thursday. Low Blood Pressure: Care Instructions  Your Care Instructions  Blood pressure is a measurement of the force of the blood against the walls of the blood vessels during and after each beat of the heart. Low blood pressure (hypotension) means that your blood pressure is much lower than normal. Some people, especially young, slim women, may have slightly low blood pressure without symptoms. However, in many people, low blood pressure can cause symptoms such as dizziness or lightheadedness. When your blood pressure is too low, your heart, brain, and other organs do not get enough blood. Low blood pressure can be caused by many things, including heart problems and some medicines. Uncontrolled diabetes can cause your blood pressure to drop, and so can a severe allergic reaction or infection. Another cause is dehydration, which is when your body loses too much fluid. Treatment for low blood pressure depends on the cause. Follow-up care is a key part of your treatment and safety. Be sure to make and go to all appointments, and call your doctor if you are having problems. It's also a good idea to know your test results and keep a list of the medicines you take. How can you care for yourself at home? · Drink plenty of fluids, enough so that your urine is light yellow or clear like water. If you have kidney, heart, or liver disease and have to limit fluids, talk with your doctor before you increase the amount of fluids you drink. · Be safe with medicines. Call your doctor if you think you are having a problem with your medicine. You will get more details on the specific medicines your doctor prescribes. · Stand up or get out of bed very slowly to allow your body to adjust.  · Get plenty of rest.  · Do not smoke. Smoking increases your risk of heart attack.  If you need help quitting, talk to your doctor about stop-smoking programs and medicines. These can increase your chances of quitting for good. · Limit alcohol to 2 drinks a day for men and 1 drink a day for women. Alcohol may interfere with your medicine. In addition, alcohol can make your low blood pressure worse by causing your body to lose water. When should you call for help? Call 911 anytime you think you may need emergency care. For example, call if:  · You have symptoms of a heart attack. These may include:  ¨ Chest pain or pressure, or a strange feeling in the chest.  ¨ Sweating. ¨ Shortness of breath. ¨ Nausea or vomiting. ¨ Pain, pressure, or a strange feeling in the back, neck, jaw, or upper belly or in one or both shoulders or arms. ¨ Lightheadedness or sudden weakness. ¨ A fast or irregular heartbeat. After you call 911, the  may tell you to chew 1 adult-strength or 2 to 4 low-dose aspirin. Wait for an ambulance. Do not try to drive yourself. · You have symptoms of a stroke. These may include:  ¨ Sudden numbness, tingling, weakness, or loss of movement in your face, arm, or leg, especially on only one side of your body. ¨ Sudden vision changes. ¨ Sudden trouble speaking. ¨ Sudden confusion or trouble understanding simple statements. ¨ Sudden problems with walking or balance. ¨ A sudden, severe headache that is different from past headaches. · You passed out (lost consciousness). Call your doctor now or seek immediate medical care if:  · You are dizzy or lightheaded, or you feel like you may faint. · You have signs of needing more fluids. You have sunken eyes and a dry mouth, and you pass only a little dark urine. · You cannot keep down fluids. Watch closely for changes in your health, and be sure to contact your doctor if:  · You do not get better as expected. Where can you learn more? Go to http://jose-bernadine.info/.   Enter C304 in the search box to learn more about \"Low Blood Pressure: Care Instructions. \"  Current as of: April 21, 2016  Content Version: 11.1  © 8439-7094 [employer]. Care instructions adapted under license by [employer] (which disclaims liability or warranty for this information). If you have questions about a medical condition or this instruction, always ask your healthcare professional. [address] any warranty or liability for your use of this information. Oral Rehydration: Care Instructions  Your Care Instructions  Dehydration occurs when your body loses too much water. This can happen if you do not drink enough fluids or lose a lot of fluid due to diarrhea, vomiting, or sweating. Being dehydrated can cause health problems and can even be life-threatening. To replace lost fluids, you need to drink liquid that contains special chemicals called electrolytes. Electrolytes keep your body working well. Plain water does not have electrolytes. You also need to rest to prevent more fluid loss. Replacing water and electrolytes (oral rehydration) completely takes about 36 hours. But you should feel better within a few hours. Follow-up care is a key part of your treatment and safety. Be sure to make and go to all appointments, and call your doctor if you are having problems. It's also a good idea to know your test results and keep a list of the medicines you take. How can you care for yourself at home? · Take frequent sips of a drink such as Gatorade, Powerade, or other rehydration drinks that your doctor suggests. These replace both fluid and important chemicals (electrolytes) you need for balance in your blood. · Drink 2 quarts of cool liquid over 2 to 4 hours. You should have at least 10 glasses of liquid a day to replace lost fluid. If you have kidney, heart, or liver disease and have to limit fluids, talk with your doctor before you increase the amount of fluids you drink.   · Make your own drink. Measure everything carefully. The drink may not work well or may even be harmful if the amounts are off. ¨ 1 quart water  ¨ ½ teaspoon salt  ¨ 6 teaspoons sugar  · Do not drink liquid with caffeine, such as coffee and jace. · Do not drink any alcohol. It can make you dehydrated. · Drink plenty of fluids, enough so that your urine is light yellow or clear like water. If you have kidney, heart, or liver disease and have to limit fluids, talk with your doctor before you increase the amount of fluids you drink. When should you call for help? Call 911 anytime you think you may need emergency care. For example, call if:  · You have signs of severe dehydration, such as:  ¨ You are confused or unable to stay awake. ¨ You passed out (lost consciousness). Call your doctor now or seek immediate medical care if:  · You still have signs of dehydration. You have sunken eyes and a dry mouth, and you pass only a little dark urine. · You are dizzy or lightheaded, or you feel like you may faint. · You are not able to keep down fluids. Watch closely for changes in your health, and be sure to contact your doctor if:  · You do not get better as expected. Where can you learn more? Go to http://jose-bernadine.info/. Enter I040 in the search box to learn more about \"Oral Rehydration: Care Instructions. \"  Current as of: May 27, 2016  Content Version: 11.1  © 6186-4405 [employer]. Care instructions adapted under license by [employer] (which disclaims liability or warranty for this information). If you have questions about a medical condition or this instruction, always ask your healthcare professional. [address] any warranty or liability for your use of this information.

## 2017-01-10 NOTE — IP AVS SNAPSHOT
Summary of Care Report The Summary of Care report has been created to help improve care coordination. Users with access to [employer] or Kili (Web-based application) may access additional patient information including the Discharge Summary. If you are not currently a Kili user and need more information, please call the number listed below in the [place] section and ask to be connected with Medical Records. Facility Information Name Address Phone [facility] [address] [place] [address] [phone] Patient Information Patient Name Sex ALECIA Fierro ([id number]) Male 1940 Discharge Information Admitting Provider Service Area Unit Estefanía Malone MD / [address] / [phone] Discharge Provider Discharge Date/Time Discharge Disposition Destination Estefanía Malone MD / [phone] 17 1300 [facility] (none) Patient Language Language ENGLISH [13] Problem List as of 2017  Date Reviewed: 2017 Codes Priority Class Noted - Resolved * (Principal)Acute on chronic systolic congestive heart failure (HCC) ICD-10-CM: S92.21 ICD-9-CM: 428.23, 428.0   2010 - Present HTN (hypertension) ICD-10-CM: I10 
ICD-9-CM: 401.9   2010 - Present SOB (shortness of breath) ICD-10-CM: R06.02 
ICD-9-CM: 786.05   2010 - Present PPD positive ICD-10-CM: R76.11 
ICD-9-CM: 795.51   2010 - Present Bronchospasm ICD-10-CM: J98.01 
ICD-9-CM: 519.11   2010 - Present Anemia ICD-10-CM: D64.9 ICD-9-CM: 285.9   3/3/2011 - Present Chest discomfort ICD-10-CM: R07.89 ICD-9-CM: 786.59   3/3/2011 - Present Pacemaker ICD-10-CM: Z95.0 ICD-9-CM: V45.01   3/3/2011 - Present  Overview Signed 3/3/2011  1:00 PM by Angel Lei MD  
 Device placed March 5, 2009. Single lead VVI defibrillator, placed by Sydney Amador MD, [address] [facility], [phone] [facility], serial number  [id number] Guidant lead               Model [id number],  Serial number  [id number] Dizziness ICD-10-CM: L32 ICD-9-CM: 780.4   3/3/2011 - Present Anxiety ICD-10-CM: F41.9 ICD-9-CM: 300.00   10/24/2013 - Present Rectus diastasis ICD-10-CM: M62.08 
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  
  
CONTINUE these medications which have NOT CHANGED Dose & Instructions Dispensing Information Comments  
 acetaminophen 650 mg CR tablet Commonly known as:  TYLENOL ARTHRITIS PAIN Dose:  650 mg Take 1 Tab by mouth daily. Indications: ARTHRITIC PAIN Quantity:  30 Tab Refills:  11  
   
 aspirin 81 mg chewable tablet Dose:  81 mg Take 81 mg by mouth daily. Refills:  0  
   
 furosemide 40 mg tablet Commonly known as:  LASIX Dose:  40 mg Take 40 mg by mouth daily. Refills:  0  
   
 multivitamin tablet Commonly known as:  ONE A DAY Dose:  1 Tab Take 1 Tab by mouth daily. Refills:  0  
   
 omeprazole 40 mg capsule Commonly known as:  PRILOSEC Dose:  40 mg Take 1 Cap by mouth daily. Quantity:  90 Cap Refills:  3  
   
 potassium chloride 10 mEq tablet Commonly known as:  K-DUR, KLOR-CON Dose:  10 mEq Take 1 Tab by mouth daily. Quantity:  90 Tab Refills:  3 **Patient requests 90 days supply**  
  
 tamsulosin 0.4 mg capsule Commonly known as:  FLOMAX TAKE ONE CAPSULE BY MOUTH DAILY Quantity:  90 Cap Refills:  3  
 **Patient requests 90 days supply** STOP taking these medications Comments  
 carvedilol 3.125 mg tablet Commonly known as:  COREG  
   
   
 diclofenac 1 % Gel Commonly known as:  VOLTAREN  
   
   
 losartan 50 mg tablet Commonly known as:  COZAAR Current Immunizations Name Date Influenza High Dose Vaccine PF 12/1/2014 Influenza Vaccine 10/24/2013 Tdap 6/14/2016 Follow-up Information Follow up With Details Comments Contact [facility] [place] [address] [facility]. will be contacted by a health  to schedule a home visit. [address] [facility] Brandon Dennis MD  You are scheduled to see Dr. Claude Dimmer on 1/16/17 at 2:40 pm. [address] [place] [address] 
[phone] Feli Mac NP Go on 1/17/2017 please go to your follow up appointment at 2:20 [place] [address] [facility] [place] [address] [address] 
[phone] Sumanth Perez MD  You are scheduled to see Dr. Sumanth Perez at the [address] [place] [address] on 1/23/17 at 9:45 am.  Please arrive 30 minutes early with a picture ID and your insurance card. [phone] [place] [address] [address] 
[phone] Discharge Instructions Low Blood Pressure: Care Instructions Your Care Instructions Blood pressure is a measurement of the force of the blood against the walls of the blood vessels during and after each beat of the heart. Low blood pressure (hypotension) means that your blood pressure is much lower than normal. Some people, especially young, slim women, may have slightly low blood pressure without symptoms.  However, in many people, low blood pressure can cause symptoms such as dizziness or lightheadedness. When your blood pressure is too low, your heart, brain, and other organs do not get enough blood. Low blood pressure can be caused by many things, including heart problems and some medicines. Uncontrolled diabetes can cause your blood pressure to drop, and so can a severe allergic reaction or infection. Another cause is dehydration, which is when your body loses too much fluid. Treatment for low blood pressure depends on the cause. Follow-up care is a key part of your treatment and safety. Be sure to make and go to all appointments, and call your doctor if you are having problems. It's also a good idea to know your test results and keep a list of the medicines you take. How can you care for yourself at home? · Drink plenty of fluids, enough so that your urine is light yellow or clear like water. If you have kidney, heart, or liver disease and have to limit fluids, talk with your doctor before you increase the amount of fluids you drink. · Be safe with medicines. Call your doctor if you think you are having a problem with your medicine. You will get more details on the specific medicines your doctor prescribes. · Stand up or get out of bed very slowly to allow your body to adjust. 
· Get plenty of rest. 
· Do not smoke. Smoking increases your risk of heart attack. If you need help quitting, talk to your doctor about stop-smoking programs and medicines. These can increase your chances of quitting for good. · Limit alcohol to 2 drinks a day for men and 1 drink a day for women. Alcohol may interfere with your medicine. In addition, alcohol can make your low blood pressure worse by causing your body to lose water. When should you call for help? Call 911 anytime you think you may need emergency care. For example, call if: 
· You have symptoms of a heart attack. These may include: ¨ Chest pain or pressure, or a strange feeling in the chest. 
 ¨ Sweating. ¨ Shortness of breath. ¨ Nausea or vomiting. ¨ Pain, pressure, or a strange feeling in the back, neck, jaw, or upper belly or in one or both shoulders or arms. ¨ Lightheadedness or sudden weakness. ¨ A fast or irregular heartbeat. After you call 911, the  may tell you to chew 1 adult-strength or 2 to 4 low-dose aspirin. Wait for an ambulance. Do not try to drive yourself. · You have symptoms of a stroke. These may include: 
¨ Sudden numbness, tingling, weakness, or loss of movement in your face, arm, or leg, especially on only one side of your body. ¨ Sudden vision changes. ¨ Sudden trouble speaking. ¨ Sudden confusion or trouble understanding simple statements. ¨ Sudden problems with walking or balance. ¨ A sudden, severe headache that is different from past headaches. · You passed out (lost consciousness). Call your doctor now or seek immediate medical care if: 
· You are dizzy or lightheaded, or you feel like you may faint. · You have signs of needing more fluids. You have sunken eyes and a dry mouth, and you pass only a little dark urine. · You cannot keep down fluids. Watch closely for changes in your health, and be sure to contact your doctor if: 
· You do not get better as expected. Where can you learn more? Go to http://jose-bernadine.info/. Enter C304 in the search box to learn more about \"Low Blood Pressure: Care Instructions. \" Current as of: April 21, 2016 Content Version: 11.1 © 9099-7130 [employer]. Care instructions adapted under license by [employer] (which disclaims liability or warranty for this information). If you have questions about a medical condition or this instruction, always ask your healthcare professional. [place] [address] any warranty or liability for your use of this information. Chart Review Routing History Recipient Method Report Sent By Liss Bolivar Mike Cabral MD  
Phone: [phone] In [employer] Routed Notes Susan Bernabe [[id number]] 3/30/2016  7:02 PM 03/30/2016 Mike Cabral MD  
Phone: [phone] In Basket Notes/Transcriptions Radha Adamson MD [[id number]] 3/31/2016  7:58 AM 03/31/2016 Radha Adamson MD  
Fax: [phone] Phone: [phone] Fax Notes/Transcriptions MD Orquidea Cabello 3/31/2016  7:58 AM 03/31/2016 Mike Cabral MD  
Phone: [phone] In Basket Notes/Transcriptions Radha Adamson MD [[id number]] 4/1/2016 11:53 AM 04/01/2016 Radha Adamson MD  
Fax: [phone] Phone: [phone] Fax Notes/Transcriptions MD Orquidea Cabello 4/1/2016 11:53 AM 04/01/2016 Mike Cabral MD  
Phone: [phone] In Basket IP Auto Routed Notes Ruby Reyes MD [[id number]] 4/1/2016  3:20 PM 04/01/2016 Lidia Rinne, NP Phone: [phone] In Basket IP Auto Routed Notes Ilia Squires MD [[id number]] 9/2/2016 10:23 PM 09/02/2016 Lidia Rinne, NP Phone: [phone] In Basket IP Auto Routed Notes Gianna Dimas MD [[id number]] 9/5/2016  9:11 AM 09/05/2016 Gina Holder NP Phone: [phone] In Basket IP Auto Routed Notes Jarrett Pollard MD [[id number]] 1/10/2017  7:22 PM 01/10/2017 Gina Holder NP Phone: [phone] In Basket IP Auto Routed Notes Haylie Ambrosio MD [[id number]] 1/12/2017 11:57 PM 01/12/2017

## 2017-01-10 NOTE — IP AVS SNAPSHOT
Current Discharge Medication List  
  
Take these medications at their scheduled times Dose & Instructions Dispensing Information Comments Morning Noon Evening Bedtime  
 acetaminophen 650 mg CR tablet Commonly known as:  TYLENOL ARTHRITIS PAIN Your next dose is: Today, Tomorrow Other:  ____________ Dose:  650 mg Take 1 Tab by mouth daily. Indications: ARTHRITIC PAIN Quantity:  30 Tab Refills:  11  
     
   
   
   
  
 aspirin 81 mg chewable tablet Your next dose is: Today, Tomorrow Other:  ____________ Dose:  81 mg Take 81 mg by mouth daily. Refills:  0  
     
   
   
   
  
 furosemide 40 mg tablet Commonly known as:  LASIX Your next dose is: Today, Tomorrow Other:  ____________ Dose:  40 mg Take 40 mg by mouth daily. Refills:  0  
     
   
   
   
  
 multivitamin tablet Commonly known as:  ONE A DAY Your next dose is: Today, Tomorrow Other:  ____________ Dose:  1 Tab Take 1 Tab by mouth daily. Refills:  0  
     
   
   
   
  
 omeprazole 40 mg capsule Commonly known as:  PRILOSEC Your next dose is: Today, Tomorrow Other:  ____________ Dose:  40 mg Take 1 Cap by mouth daily. Quantity:  90 Cap Refills:  3  
     
   
   
   
  
 potassium chloride 10 mEq tablet Commonly known as:  K-DURFRANCESOR-CON Your next dose is: Today, Tomorrow Other:  ____________ Dose:  10 mEq Take 1 Tab by mouth daily. Quantity:  90 Tab Refills:  3  
 **Patient requests 90 days supply** Take these medications as directed Dose & Instructions Dispensing Information Comments Morning Noon Evening Bedtime  
 tamsulosin 0.4 mg capsule Commonly known as:  FLOMAX Your next dose is: Today, Tomorrow Other:  ____________ TAKE ONE CAPSULE BY MOUTH DAILY Quantity:  90 Cap Refills:  3  
 **Patient requests 90 days supply**

## 2017-01-10 NOTE — H&P
[facility] 310  Admission History and Physical      NAME:  Claudio Rivera   :   1940   MRN:  [id number]     PCP:  Venkat Vitale NP     Date/Time:  1/10/2017      CHIEF COMPLAINT:low bp    HISTORY OF PRESENT ILLNESS:     Mr. Maddison Medina is a 77 y.o. BLACK OR  male with PMX of CVA s/p TPA, Gastritis, GERD, Ischemic Cardiomyopathy s/p ICD, Hyperlipidemia, HTN, Arthritis who presented to the Emergency Department today because of low BP. Patient was at his PCP office for follow up and was transferred to ED because of low blood pressure readings. As per notes,pt's BP 79/57 and 88/60(repeat) in the office. PCP reports pt's is asymptomatic and normal BP is usually around 100's/60's. He was recently discharged from [facility] and Coreg was restarted . As per my d/w Dr Chang Vasquez was supposed to f/u with Dr Margy Lakhani at [facility]. Pt has severe Pulmonary HTN and he recommends pt should be transferred to [address] [place] [address] for invasive pulmonary hypertension management. But [facility] are unable to accept pt to their hospital at this time because of diversion.              Past Medical History   Diagnosis Date    Arthritis     CHF (congestive heart failure) ([address]     Dizziness 3/3/2011    GERD (gastroesophageal reflux disease)     Hypercholesterolemia     Hypertension     ICD (implantable cardiac defibrillator), single, in situ 3/3/2011    Rectus diastasis 2014    Stroke [facility])         Past Surgical History   Procedure Laterality Date    Hx orthopaedic       left knee rep , rt kneerepl     Pr cardiac surg procedure unlist       defibulator     Pr egd balloon dilation esophagus <30 mm diam  10/5/2012          Upper gi endoscopy,biopsy  3/31/2016          Colonoscopy,remv lesn,snare  2016             Social History   Substance Use Topics    Smoking status: Former Smoker    Smokeless tobacco: Never Used    Alcohol use 1.5 oz/week     3 Cans of beer per week        Family History   Problem Relation Age of Onset    Heart Disease Mother     Heart Disease Sister         No Known Allergies     Prior to Admission medications    Medication Sig Start Date End Date Taking? Authorizing Provider   aspirin 81 mg chewable tablet Take 81 mg by mouth daily. Yes Historical Provider   losartan (COZAAR) 50 mg tablet Take 25 mg by mouth daily. Yes Historical Provider   carvedilol (COREG) 3.125 mg tablet Take 3.125 mg by mouth two (2) times daily (with meals). Yes Historical Provider   furosemide (LASIX) 40 mg tablet Take 40 mg by mouth daily. Yes Historical Provider   multivitamin (ONE A DAY) tablet Take 1 Tab by mouth daily. Yes Historical Provider   diclofenac (VOLTAREN) 1 % gel Apply 4 g to affected area four (4) times daily. 10/13/16  Yes Venora Donaldson, NP   tamsulosin (FLOMAX) 0.4 mg capsule TAKE ONE CAPSULE BY MOUTH DAILY 10/3/16  Yes Venora Donaldson, NP   omeprazole (PRILOSEC) 40 mg capsule Take 1 Cap by mouth daily. 6/14/16  Yes Venora Donaldson, NP   acetaminophen (TYLENOL ARTHRITIS PAIN) 650 mg CR tablet Take 1 Tab by mouth daily. Indications: ARTHRITIC PAIN 6/14/16  Yes Venora Donaldson, NP   potassium chloride (K-DUR, KLOR-CON) 10 mEq tablet Take 1 Tab by mouth daily. 11/30/15  Yes Tone Hernandez MD   allopurinol (ZYLOPRIM) 100 mg tablet Take 100 mg by mouth daily.     Historical Provider         Review of Systems:    Constitutional: negative for fevers, chills, sweats, fatigue, malaise, anorexia and weight loss   Eyes: negative for irritation, redness and icterus   Ears, nose, mouth, throat, and face: negative for ear drainage, earaches, nasal congestion, sore mouth and sore throat   Respiratory: negative for cough, sputum, hemoptysis, pleurisy/chest pain, asthma, wheezing or dyspnea on exertion   Cardiovascular:as per hpi  Gastrointestinal: negative for nausea, vomiting, diarrhea, constipation and abdominal pain   Genitourinary:negative for frequency and dysuria Hematologic/lymphatic: negative for easy bruising, bleeding, lymphadenopathy, petechiae and coughing up blood   Musculoskeletal:negative for myalgias, arthralgias and muscle weakness   Neurological: negative for headaches and dizziness   Endocrine: Denies heat or cold intolerance       Objective:      VITALS:    Vital signs reviewed; most recent are:    Visit Vitals    BP 96/64 (BP 1 Location: Right arm, BP Patient Position: At rest)    Pulse 91    Temp 96.3 °F (35.7 °C)    Resp 19    Ht 5' 9\" (1.753 m)    Wt 81.8 kg (180 lb 5.4 oz)    SpO2 98%    BMI 26.63 kg/m2     SpO2 Readings from Last 6 Encounters:   01/10/17 98%   01/10/17 96%   01/05/17 97%   12/22/16 95%   11/11/16 98%   10/13/16 94%        No intake or output data in the 24 hours ending 01/10/17 1916         Exam:     Physical Exam:    Gen:  Well-developed, well-nourished, in no acute distress  HEENT:  Pink conjunctivae, PERRL, hearing intact to voice, moist mucous membranes  Neck:  Supple, without masses, thyroid non-tender  Resp:  No accessory muscle use, clear breath sounds without wheezes rales or rhonchi  Card:  No murmurs, normal S1, S2 without thrills, bruits or peripheral edema,ICD +  Abd:  Soft, non-tender, non-distended, normoactive bowel sounds are present, no palpable organomegaly  Lymph:  No cervical adenopathy  Musc:  No cyanosis or clubbing  Skin:  No rashes or ulcers, skin turgor is good  Neuro:  Cranial nerves 3-12 are grossly intact,  strength is 5/5 bilaterally, dorsi / plantarflexion strength is 5/5 bilaterally, follows commands appropriately  Psych:  Alert with good insight.   Oriented to person, place, and time       Labs:    Recent Labs      01/10/17   1519   WBC  5.1   HGB  12.3   HCT  37.7   PLT  219     Recent Labs      01/10/17   1519   NA  143   K  4.4   CL  107   CO2  28   GLU  103*   BUN  24*   CREA  1.46*   CA  8.5   ALB  3.5   SGOT  20   ALT  25     No components found for: GLPOC  No results for input(s): PH, PCO2, PO2, HCO3, FIO2 in the last 72 hours. No results for input(s): INR in the last 72 hours. No lab exists for component: INREXT, INREXT    Chest Xray:   EKG reviewed:         Assessment/Plan:       Hx of HTN, but currently Hypotensive ,multifactorial:combinaiton of recent addition of lasix and coreg,Severe pulmonary HTN POA  Hold lasix,anti hypertensive    Patient`s blood pressure responded well to normal saline bolus in ED. Continue to monitor blood pressure . okay to give 250 ml normal saline bolus for SBP<90  O2 via NC to keep Sats>92%  Cardio already on board. As per my d/w Dr Deanne Tavarez was supposed to f/u with Dr Causey Degree at [facility]. Pt has severe Pulmonary HTN and he recommends pt should be transferred to [address] [place] [address] for invasive pulmonary hypertension management. But [facility] are unable to accept pt to their hospital at this time because of diversion.       CVA s/p TPA  GERD,  Ischemic Cardiomyopathy s/p ICD  Hyperlipidemia    Resume home meds except for lasix,coreg,losartan    Code Status:  Full Code    Surrogate decision maker:nicole Quick    Total time spent with patient: 65 [facility] discussed with: Patient, Nursing Staff and Consultant/Specialist    Discussed:  Care Plan    Prophylaxis:  Lovenox    Probable Disposition:  [facility]           ___________________________________________________    Attending Physician: Mily Maier MD

## 2017-01-10 NOTE — ED NOTES
TRANSFER - OUT REPORT:    Verbal report given to FARHAT Garcia on Lashanda James  being transferred to inpatient telemetry monitoring unit for change in patient condition(admission.)       Report consisted of patients Situation, Background, Assessment and   Recommendations(SBAR). Information from the following report(s) Kardex and ED Summary was reviewed with the receiving nurse. Lines:   Peripheral IV 01/10/17 Right Antecubital (Active)   Site Assessment Clean, dry, & intact 1/10/2017  3:22 PM   Phlebitis Assessment 0 1/10/2017  3:22 PM   Infiltration Assessment 0 1/10/2017  3:22 PM   Dressing Status Clean, dry, & intact 1/10/2017  3:22 PM   Dressing Type Transparent 1/10/2017  3:22 PM   Hub Color/Line Status Pink;Flushed 1/10/2017  3:22 PM        Opportunity for questions and clarification was provided.       Patient transported with:   Monitor  Registered Nurse  Tech

## 2017-01-10 NOTE — ED NOTES
Emergency Department Nursing Plan of Care       The Nursing Plan of Care is developed from the Nursing assessment and Emergency Department Attending provider initial evaluation. The plan of care may be reviewed in the ED Provider note.     The Plan of Care was developed with the following considerations:   Patient / Family readiness to learn indicated by:verbalized understanding  Persons(s) to be included in education: patient  Barriers to Learning/Limitations:No    Signed     Monty Osgood, RN    1/10/2017   3:24 PM

## 2017-01-10 NOTE — MR AVS SNAPSHOT
Visit Information Date & Time Provider Department Dept. Phone Encounter #  
 1/10/2017  1:20 PM Jules Heller NP [facility] [facility] [phone] [id number] Follow-up Instructions Return in about 2 days (around 1/12/2017) for hypotension f/u. Routing History Your Appointments 10/4/2017  9:40 AM  
Follow Up with Elier Cody MD  
[facility] at [facility] Appt Note: Follow up $0CP tdb 10/4/16  
 [address] [place] [address], 
[address] [place] [address], [address] [phone]  
[address], [address] [place] [address], [address] [address] Upcoming Health Maintenance Date Due Pneumococcal 65+ Low/Medium Risk (1 of 2 - PCV13) 12/22/2006 INFLUENZA AGE 9 TO ADULT 8/1/2016 MEDICARE YEARLY EXAM 11/30/2016 GLAUCOMA SCREENING Q2Y 12/1/2016 COLONOSCOPY 9/9/2020 DTaP/Tdap/Td series (2 - Td) 6/14/2026 Allergies as of 1/10/2017  Review Complete On: 1/10/2017 By: Jules Heller NP No Known Allergies Current Immunizations  Reviewed on 9/2/2016 Name Date Influenza High Dose Vaccine PF 12/1/2014 Influenza Vaccine 10/24/2013 Tdap 6/14/2016 Not reviewed this visit You Were Diagnosed With   
  
 Codes Comments Hypotension, unspecified hypotension type    -  Primary ICD-10-CM: I95.9 ICD-9-CM: 458.9 Dehydration     ICD-10-CM: E86.0 ICD-9-CM: 276.51 Xerostomia     ICD-10-CM: K11.7 ICD-9-CM: 527.7 Vitals BP Pulse Temp Resp Height(growth percentile) Weight(growth percentile) (!) 85/63 (BP 1 Location: Left arm, BP Patient Position: Supine) 88 97.9 °F (36.6 °C) (Oral) 18 5' 9\" (1.753 m) 171 lb 6.4 oz (77.7 kg) SpO2 BMI Smoking Status 96% 25.31 kg/m2 Former Smoker Vitals History BMI and BSA Data Body Mass Index Body Surface Area  
 25.31 kg/m 2 1.94 m 2 Preferred Pharmacy Pharmacy Name Phone [address] [phone] Your Updated Medication List  
  
   
This list is accurate as of: 1/10/17  1:57 PM.  Always use your most recent med list.  
  
  
  
  
 acetaminophen 650 mg CR tablet Commonly known as:  TYLENOL ARTHRITIS PAIN Take 1 Tab by mouth daily. Indications: ARTHRITIC PAIN  
  
 allopurinol 100 mg tablet Commonly known as:  Gonzella Cotta TK 1 T PO D  
  
 aspirin delayed-release 81 mg tablet TK 1 T PO  QD  
  
 atorvastatin 40 mg tablet Commonly known as:  LIPITOR TK 1 T PO NIGHTLY  
  
 clopidogrel 75 mg Tab Commonly known as:  PLAVIX TK 1 T PO D  
  
 clotrimazole-betamethasone topical cream  
Commonly known as:  Brooke Hu Apply  to affected area two (2) times a day. Apply to affected area bid/ generic formula  
  
 diclofenac 1 % Gel Commonly known as:  VOLTAREN Apply 4 g to affected area four (4) times daily. MUCINEX 600 mg ER tablet Generic drug:  guaiFENesin ER TK 1 T PO BID  
  
 multivitamin tablet Commonly known as:  ONE A DAY Take 1 Tab by mouth daily. omeprazole 40 mg capsule Commonly known as:  PRILOSEC Take 1 Cap by mouth daily. potassium chloride 10 mEq tablet Commonly known as:  K-DUR, KLOR-CON Take 1 Tab by mouth daily. PROAIR HFA IN Take  by inhalation. 2 PUFFS PRN  
  
 tamsulosin 0.4 mg capsule Commonly known as:  FLOMAX TAKE ONE CAPSULE BY MOUTH DAILY Follow-up Instructions Return in about 2 days (around 1/12/2017) for hypotension f/u. Patient Instructions You need to report directly to the ER for IV fluids to help RAISE YOUR BLOOD PRESSURE. 
 
STOP BOTH Coreg and Losartan until you return on Thursday. Low Blood Pressure: Care Instructions Your Care Instructions Blood pressure is a measurement of the force of the blood against the walls of the blood vessels during and after each beat of the heart. Low blood pressure (hypotension) means that your blood pressure is much lower than normal. Some people, especially young, slim women, may have slightly low blood pressure without symptoms. However, in many people, low blood pressure can cause symptoms such as dizziness or lightheadedness. When your blood pressure is too low, your heart, brain, and other organs do not get enough blood. Low blood pressure can be caused by many things, including heart problems and some medicines. Uncontrolled diabetes can cause your blood pressure to drop, and so can a severe allergic reaction or infection. Another cause is dehydration, which is when your body loses too much fluid. Treatment for low blood pressure depends on the cause. Follow-up care is a key part of your treatment and safety. Be sure to make and go to all appointments, and call your doctor if you are having problems. It's also a good idea to know your test results and keep a list of the medicines you take. How can you care for yourself at home? · Drink plenty of fluids, enough so that your urine is light yellow or clear like water. If you have kidney, heart, or liver disease and have to limit fluids, talk with your doctor before you increase the amount of fluids you drink. · Be safe with medicines. Call your doctor if you think you are having a problem with your medicine. You will get more details on the specific medicines your doctor prescribes. · Stand up or get out of bed very slowly to allow your body to adjust. 
· Get plenty of rest. 
· Do not smoke. Smoking increases your risk of heart attack. If you need help quitting, talk to your doctor about stop-smoking programs and medicines. These can increase your chances of quitting for good. · Limit alcohol to 2 drinks a day for men and 1 drink a day for women. Alcohol may interfere with your medicine.  In addition, alcohol can make your low blood pressure worse by causing your body to lose water. When should you call for help? Call 911 anytime you think you may need emergency care. For example, call if: 
· You have symptoms of a heart attack. These may include: ¨ Chest pain or pressure, or a strange feeling in the chest. 
¨ Sweating. ¨ Shortness of breath. ¨ Nausea or vomiting. ¨ Pain, pressure, or a strange feeling in the back, neck, jaw, or upper belly or in one or both shoulders or arms. ¨ Lightheadedness or sudden weakness. ¨ A fast or irregular heartbeat. After you call 911, the  may tell you to chew 1 adult-strength or 2 to 4 low-dose aspirin. Wait for an ambulance. Do not try to drive yourself. · You have symptoms of a stroke. These may include: 
¨ Sudden numbness, tingling, weakness, or loss of movement in your face, arm, or leg, especially on only one side of your body. ¨ Sudden vision changes. ¨ Sudden trouble speaking. ¨ Sudden confusion or trouble understanding simple statements. ¨ Sudden problems with walking or balance. ¨ A sudden, severe headache that is different from past headaches. · You passed out (lost consciousness). Call your doctor now or seek immediate medical care if: 
· You are dizzy or lightheaded, or you feel like you may faint. · You have signs of needing more fluids. You have sunken eyes and a dry mouth, and you pass only a little dark urine. · You cannot keep down fluids. Watch closely for changes in your health, and be sure to contact your doctor if: 
· You do not get better as expected. Where can you learn more? Go to http://jose-bernadine.info/. Enter C304 in the search box to learn more about \"Low Blood Pressure: Care Instructions. \" Current as of: April 21, 2016 Content Version: 11.1 © 3046-4350 [email or web address].  Care instructions adapted under license by [employer] (which disclaims liability or warranty for this information). If you have questions about a medical condition or this instruction, always ask your healthcare professional. [address] any warranty or liability for your use of this information. Oral Rehydration: Care Instructions Your Care Instructions Dehydration occurs when your body loses too much water. This can happen if you do not drink enough fluids or lose a lot of fluid due to diarrhea, vomiting, or sweating. Being dehydrated can cause health problems and can even be life-threatening. To replace lost fluids, you need to drink liquid that contains special chemicals called electrolytes. Electrolytes keep your body working well. Plain water does not have electrolytes. You also need to rest to prevent more fluid loss. Replacing water and electrolytes (oral rehydration) completely takes about 36 hours. But you should feel better within a few hours. Follow-up care is a key part of your treatment and safety. Be sure to make and go to all appointments, and call your doctor if you are having problems. It's also a good idea to know your test results and keep a list of the medicines you take. How can you care for yourself at home? · Take frequent sips of a drink such as Gatorade, Powerade, or other rehydration drinks that your doctor suggests. These replace both fluid and important chemicals (electrolytes) you need for balance in your blood. · Drink 2 quarts of cool liquid over 2 to 4 hours. You should have at least 10 glasses of liquid a day to replace lost fluid. If you have kidney, heart, or liver disease and have to limit fluids, talk with your doctor before you increase the amount of fluids you drink. · Make your own drink. Measure everything carefully. The drink may not work well or may even be harmful if the amounts are off. ¨ 1 quart water ¨ ½ teaspoon salt ¨ 6 teaspoons sugar · Do not drink liquid with caffeine, such as coffee and jace. · Do not drink any alcohol. It can make you dehydrated. · Drink plenty of fluids, enough so that your urine is light yellow or clear like water. If you have kidney, heart, or liver disease and have to limit fluids, talk with your doctor before you increase the amount of fluids you drink. When should you call for help? Call 911 anytime you think you may need emergency care. For example, call if: 
· You have signs of severe dehydration, such as: 
¨ You are confused or unable to stay awake. ¨ You passed out (lost consciousness). Call your doctor now or seek immediate medical care if: 
· You still have signs of dehydration. You have sunken eyes and a dry mouth, and you pass only a little dark urine. · You are dizzy or lightheaded, or you feel like you may faint. · You are not able to keep down fluids. Watch closely for changes in your health, and be sure to contact your doctor if: 
· You do not get better as expected. Where can you learn more? Go to http://jose-bernadine.info/. Enter I040 in the search box to learn more about \"Oral Rehydration: Care Instructions. \" Current as of: May 27, 2016 Content Version: 11.1 © 3000-6901 [employer]. Care instructions adapted under license by [employer] (which disclaims liability or warranty for this information). If you have questions about a medical condition or this instruction, always ask your healthcare professional. [place] [address] any warranty or liability for your use of this information. Introducing [facility] & HEALTH SERVICES! [facility] introduces GoodChime! patient portal. Now you can access parts of your medical record, email your doctor's office, and request medication refills online. 1. In your internet browser, go to [email or web address] 2. Click on the First Time User? Click Here link in the Sign In box. You will see the New Member Sign Up page. 3. Enter your [employer] Access Code exactly as it appears below. You will not need to use this code after youve completed the sign-up process. If you do not sign up before the expiration date, you must request a new code. · [employer] Access Code: [email or web address] Expires: 1/23/2017  9:52 AM 
 
4. Enter the last four digits of your Social Security Number (xxxx) and Date of Birth (mm/dd/yyyy) as indicated and click Submit. You will be taken to the next sign-up page. 5. Create a [employer] ID. This will be your [employer] login ID and cannot be changed, so think of one that is secure and easy to remember. 6. Create a [employer] password. You can change your password at any time. 7. Enter your Password Reset Question and Answer. This can be used at a later time if you forget your password. 8. Enter your e-mail address. You will receive e-mail notification when new information is available in [address] 9. Click Sign Up. You can now view and download portions of your medical record. 10. Click the Download Summary menu link to download a portable copy of your medical information. If you have questions, please visit the Frequently Asked Questions section of the [employer] website. Remember, [employer] is NOT to be used for urgent needs. For medical emergencies, dial 911. Now available from your iPhone and Android! Please provide this summary of care documentation to your next provider. Your primary care clinician is listed as CLAIRE Hayden. If you have any questions after today's visit, please call [phone].

## 2017-01-10 NOTE — ED NOTES
Patient updated to plan of care. No complaints of pain at this time. Resting in bed. Provided with cardiac meal tray.

## 2017-01-10 NOTE — PROGRESS NOTES
1. Have you been to the ER, urgent care clinic since your last visit? Hospitalized since your last visit? Yes When: 1/2/17 [facility] for sob    2. Have you seen or consulted any other health care providers outside of the [address] [place] [address] since your last visit? Include any pap smears or colon screening.  No     Pt is here for   Chief Complaint   Patient presents with   [address] [place] [address] ED Follow-up     1/2/17 [facility] for SOB    Cerebrovascular Accident     follow up    Request For New Medication     pt states he would lie a Rx for an inhaler     Pt denies pain at this time

## 2017-01-10 NOTE — ED PROVIDER NOTES
HPI Comments: Pt is a 75y/o AAM who presents to ED from PCP office. NP Jessee Bearden called ED concerned about pt's low BP. NP reports pt's BP 79/57 and 88/60(repeat) in the office. She reports pt's is asymptomatic and normal BP is usually around 100's/60's. Pt c/o dry mouth and SOB. Pt states he was admitted the early part of Jan at [facility] for CHF and since then has had cough and SOB. Pt states they had originally taken him off Lasix but he was restarted on it after last admission. Pt is being scheduled for catheterization per cardiologist(Dr Leyla Carter)    Patient is a 77 y.o. male presenting with hypotension. The history is provided by the patient (and NP at PCP office). Hypotension    This is a new problem. The current episode started less than 1 hour ago. The problem has not changed since onset. Pertinent negatives include no confusion, no seizures, no weakness, no tingling and no numbness. Mental status baseline is normal.  His past medical history is significant for CVA and hypertension. Past medical history comments: GERD, Hypercholesterolemia, ICD, CHF.  PSH: defibrillator, bilateral knee replacements.         Past Medical History:   Diagnosis Date    Arthritis     CHF (congestive heart failure) ([address]     Dizziness 3/3/2011    GERD (gastroesophageal reflux disease)     Hypercholesterolemia     Hypertension     ICD (implantable cardiac defibrillator), single, in situ 3/3/2011    Rectus diastasis 12/11/2014    Stroke [facility])        Past Surgical History:   Procedure Laterality Date    Hx orthopaedic       left knee rep 2005, rt kneerepl 2006    Pr cardiac surg procedure unlist       defibulator 2009    Pr egd balloon dilation esophagus <30 mm diam  10/5/2012          Upper gi endoscopy,biopsy  3/31/2016          Colonoscopy,remv lesn,snare  4/1/2016               Family History:   Problem Relation Age of Onset    Heart Disease Mother     Heart Disease Sister        Social History     Social History    Marital status:      Spouse name: N/A    Number of children: N/A    Years of education: N/A     Occupational History    Not on file. Social History Main Topics    Smoking status: Former Smoker    Smokeless tobacco: Never Used    Alcohol use 1.5 oz/week     3 Cans of beer per week    Drug use: Yes     Special: Marijuana      Comment: twice weekly    Sexual activity: Not on file     Other Topics Concern    Not on file     Social History Narrative    4/2016: lives with son and two grandchildren. Did 1 year of college. Used to work as . ALLERGIES: Review of patient's allergies indicates no known allergies. Review of Systems   Constitutional: Negative for fever. Respiratory: Positive for shortness of breath. Cardiovascular: Negative for chest pain. Gastrointestinal: Negative for abdominal pain, nausea and vomiting. Genitourinary: Negative. Skin: Negative for rash. Neurological: Positive for light-headedness. Negative for tingling, seizures, speech difficulty, weakness and numbness. Psychiatric/Behavioral: Negative for confusion. All other systems reviewed and are negative. Vitals:    01/10/17 1418   BP: 90/65   Pulse: (!) 55   Resp: 18   Temp: 97.4 °F (36.3 °C)   SpO2: 100%   Weight: 81.6 kg (180 lb)   Height: 5' 9\" (1.753 m)            Physical Exam   Constitutional: He is oriented to person, place, and time. He appears well-developed and well-nourished. No distress. HENT:   Head: Normocephalic and atraumatic. Mouth/Throat: Oropharynx is clear and moist. No oropharyngeal exudate. Eyes: Conjunctivae are normal.   Neck: Normal range of motion. Cardiovascular: Normal rate, regular rhythm and normal heart sounds. Pulmonary/Chest: Effort normal and breath sounds normal. No respiratory distress. He has no wheezes. He has no rales. Abdominal: Soft. Bowel sounds are normal. He exhibits no distension. There is no tenderness.  There is no rebound and no guarding. Musculoskeletal: Normal range of motion. Neurological: He is alert and oriented to person, place, and time. Skin: Skin is warm and dry. Psychiatric: He has a normal mood and affect. His behavior is normal. Judgment and thought content normal.   Nursing note and vitals reviewed. MDM  Number of Diagnoses or Management Options  Hypotension, unspecified hypotension type:   Pulmonary HTN ([address]):   SOB (shortness of breath):   Diagnosis management comments: DDX: electrolyte imbalance, dehydration, CHF exacerbation, PNA     Progress Note:    4:19 PM    I spoke with Dr. Brooke Campoverde, Consult for Cardiology. Discussed available diagnostic tests and clinical findings. He will review with DANYA Bowie who saw pt in office on 1/5/17 and then return phone call. Chayo Merritt PA-C      4:33 PM    Dr Augustus Dodd, cardiology, came to ED now. Pt was supposed to f/u with Dr Michelle López at [facility]. Pt has severe Pulmonary HTN and he recommends pt should be transferred to [facility] for this reason. He would like me to consult pulmonary fellow at [facility] for pt's symptomatic hypotension to see if they will accept. If they are unable he states pt can be admitted here until they are able to accept pt for transfer. Chayo Merritt PA-C    4:45 PM  Spoke with transfer center at [facility] and they are unable to accept pt to their hospital at this time under there restrictions. Will consult hospitalist.      5:10 PM    I spoke with Dr. Kathrin Restrepo, Consult for Hospitalist. Discussed available diagnostic tests and clinical findings. She is in agreement with care plans as outlined. She will consult Dr Augustus Dodd about appropriate bed and then admit pt accordingly.   Chayo Merritt PA-C           Amount and/or Complexity of Data Reviewed  Clinical lab tests: ordered and reviewed  Tests in the radiology section of CPT®: ordered and reviewed  Review and summarize past medical records: yes  Discuss the patient with other providers: yes      ED Course       Procedures

## 2017-01-10 NOTE — IP AVS SNAPSHOT
[facility] [address] [place] Patient: Nato Grossman MRN: [id number] [id number] You are allergic to the following No active allergies Recent Documentation Height Weight BMI Smoking Status 1.753 m 76.7 kg 24.99 kg/m2 Former Smoker Emergency Contacts Name Discharge Info Relation Home Work Mobile Yury Mcghee DISCHARGE CAREGIVER [3] Son [22] [phone] Mona Gurrola  Sister [23] [phone] Faina Hernández  Daughter [21] [phone] Margo Love  Child [2] [phone] About your hospitalization You were admitted on:  January 10, 2017 You last received care in the:  [address] You were discharged on:  January 12, 2017 Unit phone number:  [phone] Why you were hospitalized Your primary diagnosis was:  Acute On Chronic Systolic Congestive Heart Failure (Hcc) Your diagnoses also included:  Pulmonary Hypertension (Hcc) Providers Seen During Your Hospitalizations Provider Role Specialty Primary office phone Jody Vaughn MD Attending Provider Emergency Medicine [phone] Bev Eaton MD Attending Provider Emergency Medicine [phone] Hemal Guardado MD Attending Provider Hospitalist [phone] Efren Mixon MD Attending Provider Internal Medicine [phone] Your Primary Care Physician (PCP) Primary Care Physician Office Phone Office Fax [facility] [phone] Follow-up Information Follow up With Details Comments Contact Info Neli Narvaez NP   [facility] [address] [facility] 7 [address] [phone] [facility] Office on [address] [facility]. will be contacted by a health  to schedule a home visit. [address] [facility] Subhash Arciniega MD   [address] 
[phone] [address] [place] [address] MD Whit Go on 1/17/2017 please go to your follow up appointment at 2:20 [place] [address] [place] [address] [address] 
[phone] Your Appointments Tuesday January 17, 2017  2:20 PM EST  
ROUTINE CARE with Melani Vaughn NP  
[address] [place] [address] [address]  
[phone] Current Discharge Medication List  
  
CONTINUE these medications which have NOT CHANGED Dose & Instructions Dispensing Information Comments Morning Noon Evening Bedtime  
 acetaminophen 650 mg CR tablet Commonly known as:  TYLENOL ARTHRITIS PAIN Your next dose is: Today, Tomorrow Other:  _________ Dose:  650 mg Take 1 Tab by mouth daily. Indications: ARTHRITIC PAIN Quantity:  30 Tab Refills:  11  
     
   
   
   
  
 aspirin 81 mg chewable tablet Your next dose is: Today, Tomorrow Other:  _________ Dose:  81 mg Take 81 mg by mouth daily. Refills:  0  
     
   
   
   
  
 furosemide 40 mg tablet Commonly known as:  LASIX Your next dose is: Today, Tomorrow Other:  _________ Dose:  40 mg Take 40 mg by mouth daily. Refills:  0  
     
   
   
   
  
 multivitamin tablet Commonly known as:  ONE A DAY Your next dose is: Today, Tomorrow Other:  _________ Dose:  1 Tab Take 1 Tab by mouth daily. Refills:  0  
     
   
   
   
  
 omeprazole 40 mg capsule Commonly known as:  PRILOSEC Your next dose is: Today, Tomorrow Other:  _________ Dose:  40 mg Take 1 Cap by mouth daily. Quantity:  90 Cap Refills:  3  
     
   
   
   
  
 potassium chloride 10 mEq tablet Commonly known as:  K-DUR, KLOR-CON Your next dose is: Today, Tomorrow Other:  _________ Dose:  10 mEq Take 1 Tab by mouth daily. Quantity:  90 Tab Refills:  3  
 **Patient requests 90 days supply**  
    
   
   
   
  
 tamsulosin 0.4 mg capsule Commonly known as:  FLOMAX Your next dose is: Today, Tomorrow Other:  _________ TAKE ONE CAPSULE BY MOUTH DAILY Quantity:  90 Cap Refills:  3  
 **Patient requests 90 days supply** STOP taking these medications   
 carvedilol 3.125 mg tablet Commonly known as:  COREG  
   
  
 diclofenac 1 % Gel Commonly known as:  VOLTAREN  
   
  
 losartan 50 mg tablet Commonly known as:  COZAAR Discharge Instructions Low Blood Pressure: Care Instructions Your Care Instructions Blood pressure is a measurement of the force of the blood against the walls of the blood vessels during and after each beat of the heart. Low blood pressure (hypotension) means that your blood pressure is much lower than normal. Some people, especially young, slim women, may have slightly low blood pressure without symptoms. However, in many people, low blood pressure can cause symptoms such as dizziness or lightheadedness. When your blood pressure is too low, your heart, brain, and other organs do not get enough blood. Low blood pressure can be caused by many things, including heart problems and some medicines. Uncontrolled diabetes can cause your blood pressure to drop, and so can a severe allergic reaction or infection. Another cause is dehydration, which is when your body loses too much fluid. Treatment for low blood pressure depends on the cause. Follow-up care is a key part of your treatment and safety. Be sure to make and go to all appointments, and call your doctor if you are having problems. It's also a good idea to know your test results and keep a list of the medicines you take. How can you care for yourself at home? · Drink plenty of fluids, enough so that your urine is light yellow or clear like water. If you have kidney, heart, or liver disease and have to limit fluids, talk with your doctor before you increase the amount of fluids you drink. · Be safe with medicines. Call your doctor if you think you are having a problem with your medicine. You will get more details on the specific medicines your doctor prescribes. · Stand up or get out of bed very slowly to allow your body to adjust. 
· Get plenty of rest. 
· Do not smoke. Smoking increases your risk of heart attack. If you need help quitting, talk to your doctor about stop-smoking programs and medicines. These can increase your chances of quitting for good. · Limit alcohol to 2 drinks a day for men and 1 drink a day for women. Alcohol may interfere with your medicine. In addition, alcohol can make your low blood pressure worse by causing your body to lose water. When should you call for help? Call 911 anytime you think you may need emergency care. For example, call if: 
· You have symptoms of a heart attack. These may include: ¨ Chest pain or pressure, or a strange feeling in the chest. 
¨ Sweating. ¨ Shortness of breath. ¨ Nausea or vomiting. ¨ Pain, pressure, or a strange feeling in the back, neck, jaw, or upper belly or in one or both shoulders or arms. ¨ Lightheadedness or sudden weakness. ¨ A fast or irregular heartbeat. After you call 911, the  may tell you to chew 1 adult-strength or 2 to 4 low-dose aspirin. Wait for an ambulance. Do not try to drive yourself. · You have symptoms of a stroke. These may include: 
¨ Sudden numbness, tingling, weakness, or loss of movement in your face, arm, or leg, especially on only one side of your body. ¨ Sudden vision changes. ¨ Sudden trouble speaking. ¨ Sudden confusion or trouble understanding simple statements. ¨ Sudden problems with walking or balance. ¨ A sudden, severe headache that is different from past headaches. · You passed out (lost consciousness). Call your doctor now or seek immediate medical care if: 
· You are dizzy or lightheaded, or you feel like you may faint. · You have signs of needing more fluids. You have sunken eyes and a dry mouth, and you pass only a little dark urine. · You cannot keep down fluids. Watch closely for changes in your health, and be sure to contact your doctor if: 
· You do not get better as expected. Where can you learn more? Go to http://jose-bernadine.info/. Enter C304 in the search box to learn more about \"Low Blood Pressure: Care Instructions. \" Current as of: April 21, 2016 Content Version: 11.1 © 3601-9448 [employer]. Care instructions adapted under license by "[employer]" (which disclaims liability or warranty for this information). If you have questions about a medical condition or this instruction, always ask your healthcare professional. [place] [address] any warranty or liability for your use of this information. Discharge Instructions Attachments/References HEART FAILURE (ENGLISH) HEART FAILURE: GENERAL INFO (ENGLISH) Discharge Orders None Introducing [facility]! Balwinder Sandoval introduces [employer] patient portal. Now you can access parts of your medical record, email your doctor's office, and request medication refills online. 1. In your internet browser, go to [email or web address] 2. Click on the First Time User? Click Here link in the Sign In box. You will see the New Member Sign Up page. 3. Enter your [employer] Access Code exactly as it appears below. You will not need to use this code after youve completed the sign-up process. If you do not sign up before the expiration date, you must request a new code. · [employer] Access Code: [email or web address] Expires: 1/23/2017  9:52 AM 
 
 4. Enter the last four digits of your Social Security Number (xxxx) and Date of Birth (mm/dd/yyyy) as indicated and click Submit. You will be taken to the next sign-up page. 5. Create a [employer] ID. This will be your [employer] login ID and cannot be changed, so think of one that is secure and easy to remember. 6. Create a [employer] password. You can change your password at any time. 7. Enter your Password Reset Question and Answer. This can be used at a later time if you forget your password. 8. Enter your e-mail address. You will receive e-mail notification when new information is available in [address] 9. Click Sign Up. You can now view and download portions of your medical record. 10. Click the Download Summary menu link to download a portable copy of your medical information. If you have questions, please visit the Frequently Asked Questions section of the [employer] website. Remember, [employer] is NOT to be used for urgent needs. For medical emergencies, dial 911. Now available from your iPhone and Android! General Information Please provide this summary of care documentation to your next provider. Patient Signature:  ____________________________________________________________ Date:  ____________________________________________________________  
  
Pilo Tatum Provider Signature:  ____________________________________________________________ Date:  ____________________________________________________________ More Information Heart Failure: Care Instructions Your Care Instructions Heart failure occurs when your heart does not pump as much blood as the body needs. Failure does not mean that the heart has stopped pumping but rather that it is not pumping as well as it should. Over time, this causes fluid buildup in your lungs and other parts of your body.  Fluid buildup can cause shortness of breath, fatigue, swollen ankles, and other problems. By taking medicines regularly, reducing sodium (salt) in your diet, checking your weight every day, and making lifestyle changes, you can feel better and live longer. Follow-up care is a key part of your treatment and safety. Be sure to make and go to all appointments, and call your doctor if you are having problems. It's also a good idea to know your test results and keep a list of the medicines you take. How can you care for yourself at home? Medicines · Be safe with medicines. Take your medicines exactly as prescribed. Call your doctor if you think you are having a problem with your medicine. · Do not take any vitamins, over-the-counter medicine, or herbal products without talking to your doctor first. Raji Sandoval not take ibuprofen (Advil or Motrin) and naproxen (Aleve) without talking to your doctor first. They could make your heart failure worse. · You may be taking some of the following medicine. ¨ Beta-blockers can slow heart rate, decrease blood pressure, and improve your condition. Taking a beta-blocker may lower your chance of needing to be hospitalized. ¨ Angiotensin-converting enzyme inhibitors (ACEIs) reduce the heart's workload, lower blood pressure, and reduce swelling. Taking an ACEI may lower your chance of needing to be hospitalized again. ¨ Angiotensin II receptor blockers (ARBs) work like ACEIs. Your doctor may prescribe them instead of ACEIs. ¨ Diuretics, also called water pills, reduce swelling. ¨ Potassium supplements replace this important mineral, which is sometimes lost with diuretics. ¨ Aspirin and other blood thinners prevent blood clots, which can cause a stroke or heart attack. You will get more details on the specific medicines your doctor prescribes. Diet · Your doctor may suggest that you limit sodium to 2,000 milligrams (mg) a day or less.  That is less than 1 teaspoon of salt a day, including all the salt you eat in cooking or in packaged foods. People get most of their sodium from processed foods. Fast food and restaurant meals also tend to be very high in sodium. · Ask your doctor how much liquid you can drink each day. You may have to limit liquids. Weight · Weigh yourself without clothing at the same time each day. Record your weight. Call your doctor if you gain more than 3 pounds in 2 to 3 days. A sudden weight gain may mean that your heart failure is getting worse. Activity level · Start light exercise (if your doctor says it is okay). Even if you can only do a small amount, exercise will help you get stronger, have more energy, and manage your weight and your stress. Walking is an easy way to get exercise. Start out by walking a little more than you did before. Bit by bit, increase the amount you walk. · When you exercise, watch for signs that your heart is working too hard. You are pushing yourself too hard if you cannot talk while you are exercising. If you become short of breath or dizzy or have chest pain, stop, sit down, and rest. 
· If you feel \"wiped out\" the day after you exercise, walk slower or for a shorter distance until you can work up to a better pace. · Get enough rest at night. Sleeping with 1 or 2 pillows under your upper body and head may help you breathe easier. Lifestyle changes · Do not smoke. Smoking can make a heart condition worse. If you need help quitting, talk to your doctor about stop-smoking programs and medicines. These can increase your chances of quitting for good. Quitting smoking may be the most important step you can take to protect your heart. · Limit alcohol to 2 drinks a day for men and 1 drink a day for women. Too much alcohol can cause health problems. · Avoid getting sick from colds and the flu. Get a pneumococcal vaccine shot.  If you have had one before, ask your doctor whether you need another dose. Get a flu shot each year. If you must be around people with colds or the flu, wash your hands often. When should you call for help? Call 911 if you have symptoms of sudden heart failure such as: 
· You have severe trouble breathing. · You cough up pink, foamy mucus. · You have a new irregular or rapid heartbeat. Call your doctor now or seek immediate medical care if: 
· You have new or increased shortness of breath. · You are dizzy or lightheaded, or you feel like you may faint. · You have sudden weight gain, such as 3 pounds or more in 2 to 3 days. · You have increased swelling in your legs, ankles, or feet. · You are suddenly so tired or weak that you cannot do your usual activities. Watch closely for changes in your health, and be sure to contact your doctor if: 
· You develop new symptoms. Where can you learn more? Go to http://jose-bernadine.info/. Enter N585 in the search box to learn more about \"Heart Failure: Care Instructions. \" Current as of: January 27, 2016 Content Version: 11.1 © 5563-1435 [employer]. Care instructions adapted under license by [email or web address] (which disclaims liability or warranty for this information). If you have questions about a medical condition or this instruction, always ask your healthcare professional. [address] any warranty or liability for your use of this information. Learning About Heart Failure What is heart failure? Heart failure means that your heart muscle does not pump as much blood as your body needs. Failure does not mean that your heart has stopped. It means that your heart is not pumping as well as it should. Your body has an amazing ability to make up for heart failure. It may do such a good job that you don't know you have a disease. But at some point, your heart and body will no longer be able to keep up.  Then fluid starts to build up in your lungs and other parts of your body. What can you expect when you have heart failure? Heart failure is a lifelong (chronic) disease. Treatment may be able to slow the disease and help you feel better. But heart failure tends to get worse over time. Despite this, there are many steps you can take to feel better and stay healthy longer. Early on, your symptoms may not be too bad. As heart failure gets worse, symptoms typically get worse, and you may need to limit your activities. Heart failure can also get worse suddenly. If this happens, you need emergency care. Then, after treatment, your symptoms may go back to being stable (which means they stay the same) for a long time. Heart failure can lead to other health problems, such as heart rhythm problems. Over time, your treatment options may change, especially as your symptoms get worse. As heart failure gets worse, palliative care can help improve the quality of your life. You can do advance care planning to decide what kind of care you want at the end of your life. What are the symptoms? Symptoms of heart failure start to happen when your heart can't pump enough blood to the rest of your body. In the early stages of heart failure, you may: · Feel tired easily. · Be short of breath when you exert yourself. · Feel like your heart is pounding or racing (palpitations). · Feel weak or dizzy. As heart failure gets worse, fluid starts to build up in your lungs and other parts of your body. This may cause you to: · Feel short of breath even at rest. 
· Have swelling (edema), especially in your legs, ankles, and feet. · Gain weight. This may happen over just a day or two, or more slowly. · Cough or wheeze, especially when you lie down. How is heart failure treated? · You'll probably take several medicines.  
· You might attend cardiac rehabilitation (rehab) to get education and support that help you make lifestyle changes and stay as healthy as possible. · You may get a heart device. A pacemaker helps your heart pump blood. An ICD can stop abnormal heart rhythms. How can you care for yourself? There are many steps you can take to feel better and stay healthy longer. These steps are an important part of treatment. They can help you stay active and enjoy life. · Take your medicine the right way. Avoid medicines that can make your symptoms worse. · Check your weight and symptoms every day. Know what to do if your symptoms get worse. · Limit sodium to help your heart pump blood better. · Be active. Exercise regularly, but don't exercise too hard. · Be heart-healthy. Eat healthy foods, stay at a healthy weight, limit alcohol, and don't smoke. · Stay as healthy as possible. Avoid colds and flu, get help for depression and anxiety, and manage stress. Follow-up care is a key part of your treatment and safety. Be sure to make and go to all appointments, and call your doctor if you are having problems. It's also a good idea to know your test results and keep a list of the medicines you take. Where can you learn more? Go to http://jose-bernadine.info/. Enter Y786 in the search box to learn more about \"Learning About Heart Failure. \" Current as of: January 27, 2016 Content Version: 11.1 © 3684-2435 [employer]. Care instructions adapted under license by [employer] (which disclaims liability or warranty for this information). If you have questions about a medical condition or this instruction, always ask your healthcare professional. [place] [address] any warranty or liability for your use of this information.

## 2017-01-10 NOTE — PROGRESS NOTES
Jeanine Jimenez is a 77 y.o. male and presents with ED Follow-up (1/2/17 [facility] for SOB); Cerebrovascular Accident (follow up); and Request For New Medication (pt states he would lie a Rx for an inhaler)    Subjective:  Pt here to f/u SOB with ER visit on 1/2. Seen by Dr. Rosen and referred for cath. Also referred to PULM for continued SOB, will see in February. Otherwise no chest pain or leg swelling. Has noted decreased appetite and dry mouth for past 1-2 weeks.      Review of Systems  Constitutional: negative for fevers, chills, anorexia and weight loss  Eyes:   negative for visual disturbance, drainage, and irritation  ENT:   negative for tinnitus,sore throat,nasal congestion,ear pain,and hoarseness  Respiratory:  negative for cough, hemoptysis, dyspnea, and wheezing  CV:   negative for chest pain, palpitations, and lower extremity edema  GI:   negative for nausea, vomiting, diarrhea, abdominal pain, and melena  Endo:               negative for polyuria,polydipsia,polyphagia, and heat intolerance  Genitourinary: negative for frequency, urgency, dysuria, retention, and hematuria  Integument:  negative for rash, ulcerations, and pruritus  Hematologic:  negative for easy bruising and bleeding  Musculoskel: negative for arthralgias, muscle weakness,and joint pain/swelling  Neurological:  negative for headaches, dizziness, vertigo,and memory/gait problems  Behavl/Psych: negative for feelings of anxiety, depression, suicide, and mood changes    Past Medical History   Diagnosis Date    Arthritis     [facility]F [facility]congestive heart failure) ([facility] [address]     Dizziness 3/3/2011    GERD (gastroesophageal reflux disease)     Hypercholesterolemia     Hypertension     ICD (implantable cardiac defibrillator), single, in situ 3/3/2011    Rectus diastasis 12/11/2014    Stroke [facility])      Past Surgical History   Procedure Laterality Date    Hx orthopaedic       left knee rep 2005, rt kneerepl 2006    Pr cardiac surg procedure unlist       defibulator 2009    Pr egd balloon dilation esophagus <30 mm diam  10/5/2012          Upper gi endoscopy,biopsy  3/31/2016          Colonoscopy,remv lesn,snare  4/1/2016           Social History     Social History    Marital status:      Spouse name: N/A    Number of children: N/A    Years of education: N/A     Social History Main Topics    Smoking status: Former Smoker    Smokeless tobacco: Never Used    Alcohol use 1.5 oz/week     3 Cans of beer per week    Drug use: Yes     Special: Marijuana      Comment: twice weekly    Sexual activity: Not Asked     Other Topics Concern    None     Social History Narrative    4/2016: lives with son and two grandchildren. Did 1 year of college. Used to work as . Family History   Problem Relation Age of Onset    Heart Disease Mother     Heart Disease Sister      Current Outpatient Prescriptions   Medication Sig Dispense Refill    allopurinol (ZYLOPRIM) 100 mg tablet TK 1 T PO D  2    atorvastatin (LIPITOR) 40 mg tablet TK 1 T PO NIGHTLY  1    clopidogrel (PLAVIX) 75 mg tab TK 1 T PO D  1    aspirin delayed-release 81 mg tablet TK 1 T PO  QD  3    diclofenac (VOLTAREN) 1 % gel Apply 4 g to affected area four (4) times daily. 100 g 11    tamsulosin (FLOMAX) 0.4 mg capsule TAKE ONE CAPSULE BY MOUTH DAILY 90 Cap 3    omeprazole (PRILOSEC) 40 mg capsule Take 1 Cap by mouth daily. 90 Cap 3    acetaminophen (TYLENOL ARTHRITIS PAIN) 650 mg CR tablet Take 1 Tab by mouth daily. Indications: ARTHRITIC PAIN 30 Tab 11    clotrimazole-betamethasone (LOTRISONE) topical cream Apply  to affected area two (2) times a day. Apply to affected area bid/ generic formula (Patient taking differently: Apply  to affected area as needed. Apply to affected area bid/ generic formula) 45 g 5    potassium chloride (K-DUR, KLOR-CON) 10 mEq tablet Take 1 Tab by mouth daily.  90 Tab 3    multivitamin (ONE A DAY) tablet Take 1 Tab by mouth daily.      ALBUTEROL SULFATE (PROAIR HFA IN) Take  by inhalation. 2 PUFFS PRN      MUCINEX 600 mg ER tablet TK 1 T PO BID  0     No Known Allergies    Objective:  Visit Vitals    BP (!) 85/63 (BP 1 Location: Left arm, BP Patient Position: Supine)    Pulse 88    Temp 97.9 °F (36.6 °C) (Oral)    Resp 18    Ht 5' 9\" (1.753 m)    Wt 171 lb 6.4 oz (77.7 kg)    SpO2 96%    BMI 25.31 kg/m2     Wt Readings from Last 3 Encounters:   01/10/17 171 lb 6.4 oz (77.7 kg)   01/05/17 169 lb 9.6 oz (76.9 kg)   12/22/16 180 lb 6.4 oz (81.8 kg)     Physical Exam:   General appearance - alert, well appearing, and in no distress. MMM. Mental status - A/O x 4, normal mood and affect. Neck -Supple ,normal CSP. FROM, non-tender. No significant adenopathy/thyromegaly. No JVD. Chest - CTA. Symmetric chest rise. No wheezing, rales or rhonchi. Heart - Normal rate, regular rhythm. Normal S1, S2. No MGR or clicks. Abdomen - Soft,non-distended. Normoactive BS in all quadrants. NT, no mass or HSM. Ext- Radial, DP pulses, 2+ bilaterally. No pedal edema, clubbing, or cyanosis. Skin-Warm and dry. No hyperpigmentation, ulcerations, or suspicious lesions. Neuro - Normal speech, no focal findings or movement disorder. Normal strength, gait, and muscle tone. Negative ROMBERG. Assessment/Plan:  Sent to ER for IVF, report called to PA in ER. Will f/u in 2 days. See below for other orders   Follow-up Disposition:  Return in about 2 days (around 1/12/2017) for hypotension f/u. ICD-10-CM ICD-9-CM    1. Hypotension, unspecified hypotension type I95.9 458.9    2. Dehydration E86.0 276.51    3. Xerostomia K11.7 527.7      No orders of the defined types were placed in this encounter. Soumya Murphy expressed understanding of plan. An After Visit Summary was offered/printed and given to the patient.

## 2017-01-11 ENCOUNTER — APPOINTMENT (OUTPATIENT)
Dept: ULTRASOUND IMAGING | Age: 77
DRG: 292 | End: 2017-01-11
Attending: STUDENT IN AN ORGANIZED HEALTH CARE EDUCATION/TRAINING PROGRAM
Payer: MEDICARE

## 2017-01-11 LAB
AMPHET UR QL SCN: NEGATIVE
ANION GAP BLD CALC-SCNC: 8 MMOL/L (ref 5–15)
APPEARANCE UR: CLEAR
BACTERIA URNS QL MICRO: NEGATIVE /HPF
BARBITURATES UR QL SCN: NEGATIVE
BENZODIAZ UR QL: NEGATIVE
BILIRUB UR QL: NEGATIVE
BUN SERPL-MCNC: 25 MG/DL (ref 6–20)
BUN/CREAT SERPL: 18 (ref 12–20)
CALCIUM SERPL-MCNC: 8.2 MG/DL (ref 8.5–10.1)
CANNABINOIDS UR QL SCN: POSITIVE
CHLORIDE SERPL-SCNC: 107 MMOL/L (ref 97–108)
CO2 SERPL-SCNC: 27 MMOL/L (ref 21–32)
COCAINE UR QL SCN: NEGATIVE
COLOR UR: NORMAL
CREAT SERPL-MCNC: 1.41 MG/DL (ref 0.7–1.3)
CRP SERPL-MCNC: <0.29 MG/DL (ref 0–0.6)
DRUG SCRN COMMENT,DRGCM: ABNORMAL
EPITH CASTS URNS QL MICRO: NORMAL /LPF
ERYTHROCYTE [DISTWIDTH] IN BLOOD BY AUTOMATED COUNT: 14.5 % (ref 11.5–14.5)
GLUCOSE SERPL-MCNC: 85 MG/DL (ref 65–100)
GLUCOSE UR STRIP.AUTO-MCNC: NEGATIVE MG/DL
HCT VFR BLD AUTO: 37.3 % (ref 36.6–50.3)
HGB BLD-MCNC: 11.9 G/DL (ref 12.1–17)
HGB UR QL STRIP: NEGATIVE
KETONES UR QL STRIP.AUTO: NEGATIVE MG/DL
LEUKOCYTE ESTERASE UR QL STRIP.AUTO: NEGATIVE
MAGNESIUM SERPL-MCNC: 2 MG/DL (ref 1.6–2.4)
MCH RBC QN AUTO: 30.1 PG (ref 26–34)
MCHC RBC AUTO-ENTMCNC: 31.9 G/DL (ref 30–36.5)
MCV RBC AUTO: 94.2 FL (ref 80–99)
METHADONE UR QL: NEGATIVE
NITRITE UR QL STRIP.AUTO: NEGATIVE
OPIATES UR QL: NEGATIVE
PCP UR QL: NEGATIVE
PH UR STRIP: 6 [PH] (ref 5–8)
PLATELET # BLD AUTO: 189 K/UL (ref 150–400)
POTASSIUM SERPL-SCNC: 3.8 MMOL/L (ref 3.5–5.1)
POTASSIUM SERPL-SCNC: 4.4 MMOL/L (ref 3.5–5.1)
PROT UR STRIP-MCNC: NEGATIVE MG/DL
RBC # BLD AUTO: 3.96 M/UL (ref 4.1–5.7)
RBC #/AREA URNS HPF: NORMAL /HPF (ref 0–5)
SODIUM SERPL-SCNC: 142 MMOL/L (ref 136–145)
SP GR UR REFRACTOMETRY: 1.02 (ref 1–1.03)
UA: UC IF INDICATED,UAUC: NORMAL
URATE SERPL-MCNC: 6.2 MG/DL (ref 3.5–7.2)
UROBILINOGEN UR QL STRIP.AUTO: 1 EU/DL (ref 0.2–1)
WBC # BLD AUTO: 5.6 K/UL (ref 4.1–11.1)
WBC URNS QL MICRO: NORMAL /HPF (ref 0–4)

## 2017-01-11 PROCEDURE — 80307 DRUG TEST PRSMV CHEM ANLYZR: CPT | Performed by: STUDENT IN AN ORGANIZED HEALTH CARE EDUCATION/TRAINING PROGRAM

## 2017-01-11 PROCEDURE — 84550 ASSAY OF BLOOD/URIC ACID: CPT | Performed by: STUDENT IN AN ORGANIZED HEALTH CARE EDUCATION/TRAINING PROGRAM

## 2017-01-11 PROCEDURE — 76770 US EXAM ABDO BACK WALL COMP: CPT

## 2017-01-11 PROCEDURE — 83735 ASSAY OF MAGNESIUM: CPT | Performed by: STUDENT IN AN ORGANIZED HEALTH CARE EDUCATION/TRAINING PROGRAM

## 2017-01-11 PROCEDURE — 84132 ASSAY OF SERUM POTASSIUM: CPT | Performed by: STUDENT IN AN ORGANIZED HEALTH CARE EDUCATION/TRAINING PROGRAM

## 2017-01-11 PROCEDURE — 74011250636 HC RX REV CODE- 250/636: Performed by: INTERNAL MEDICINE

## 2017-01-11 PROCEDURE — 80048 BASIC METABOLIC PNL TOTAL CA: CPT | Performed by: INTERNAL MEDICINE

## 2017-01-11 PROCEDURE — 93306 TTE W/DOPPLER COMPLETE: CPT

## 2017-01-11 PROCEDURE — 74011250636 HC RX REV CODE- 250/636: Performed by: STUDENT IN AN ORGANIZED HEALTH CARE EDUCATION/TRAINING PROGRAM

## 2017-01-11 PROCEDURE — 85027 COMPLETE CBC AUTOMATED: CPT | Performed by: INTERNAL MEDICINE

## 2017-01-11 PROCEDURE — 74011250637 HC RX REV CODE- 250/637: Performed by: INTERNAL MEDICINE

## 2017-01-11 PROCEDURE — 36415 COLL VENOUS BLD VENIPUNCTURE: CPT | Performed by: INTERNAL MEDICINE

## 2017-01-11 PROCEDURE — 86140 C-REACTIVE PROTEIN: CPT | Performed by: STUDENT IN AN ORGANIZED HEALTH CARE EDUCATION/TRAINING PROGRAM

## 2017-01-11 PROCEDURE — 81001 URINALYSIS AUTO W/SCOPE: CPT | Performed by: STUDENT IN AN ORGANIZED HEALTH CARE EDUCATION/TRAINING PROGRAM

## 2017-01-11 PROCEDURE — 74011250637 HC RX REV CODE- 250/637: Performed by: STUDENT IN AN ORGANIZED HEALTH CARE EDUCATION/TRAINING PROGRAM

## 2017-01-11 PROCEDURE — 99218 HC RM OBSERVATION: CPT

## 2017-01-11 RX ORDER — SILDENAFIL CITRATE 20 MG/1
20 TABLET ORAL 3 TIMES DAILY
Status: DISCONTINUED | OUTPATIENT
Start: 2017-01-11 | End: 2017-01-11

## 2017-01-11 RX ORDER — MAGNESIUM SULFATE HEPTAHYDRATE 40 MG/ML
2 INJECTION, SOLUTION INTRAVENOUS ONCE
Status: COMPLETED | OUTPATIENT
Start: 2017-01-11 | End: 2017-01-11

## 2017-01-11 RX ORDER — CARVEDILOL 3.12 MG/1
3.12 TABLET ORAL 2 TIMES DAILY WITH MEALS
Status: DISCONTINUED | OUTPATIENT
Start: 2017-01-11 | End: 2017-01-11

## 2017-01-11 RX ORDER — POTASSIUM CHLORIDE 750 MG/1
40 TABLET, FILM COATED, EXTENDED RELEASE ORAL
Status: DISPENSED | OUTPATIENT
Start: 2017-01-11 | End: 2017-01-11

## 2017-01-11 RX ADMIN — ALLOPURINOL 100 MG: 100 TABLET ORAL at 11:30

## 2017-01-11 RX ADMIN — PANTOPRAZOLE SODIUM 40 MG: 40 TABLET, DELAYED RELEASE ORAL at 09:10

## 2017-01-11 RX ADMIN — CARVEDILOL 3.12 MG: 3.12 TABLET, FILM COATED ORAL at 10:13

## 2017-01-11 RX ADMIN — POTASSIUM CHLORIDE 40 MEQ: 750 TABLET, FILM COATED, EXTENDED RELEASE ORAL at 20:53

## 2017-01-11 RX ADMIN — ENOXAPARIN SODIUM 40 MG: 40 INJECTION, SOLUTION INTRAVENOUS; SUBCUTANEOUS at 18:11

## 2017-01-11 RX ADMIN — ASPIRIN 81 MG 81 MG: 81 TABLET ORAL at 09:10

## 2017-01-11 RX ADMIN — MAGNESIUM SULFATE HEPTAHYDRATE 2 G: 40 INJECTION, SOLUTION INTRAVENOUS at 20:59

## 2017-01-11 RX ADMIN — Medication 10 ML: at 21:06

## 2017-01-11 RX ADMIN — Medication 10 ML: at 18:10

## 2017-01-11 NOTE — PROGRESS NOTES
Cardiology:    I discussed current developments regarding this patient with Dr. Irene Pablo and Dr. Delfino Leong from [address] [place] [address]. The echo findings include an estimated PA pressure between 50 and 55 mm in the presence of a failed right ventricle. The left ventricle has severely diminished function since the last study,  With current ejection fraction around 15%. There is no mitral regurgitation. The left atrium is substantially smaller than the right atrium, which fits with the patient's ability to lie flat. He probably has a \"protected\" left atrium because of elevated pulmonary vascular resistance. Part of this may be reactive to poor LV function but most of the patient's symptoms are consistent with right heart failure. Overall, his clinical picture could justify application of an LVAD or other advanced support device. Renal function is adequate, there have been no recent cerebrovascular or other bleeding events, he is a fully compliant and well adjusted individual, and hypotension precluding the use of standard drug treatment for heart failure management is a very ominous finding. [facility] agreed to accept the transfer of this patient for advanced heart failure and pulmonary hypertension evaluation and management. . The entire matter was placed in the hands of the physician coordinator for the advanced heart failure program in Marty Davenport MD   [phone]    Dr. Ruth Ann Antonio contacted me to inform that there are no beds right now but she will re-attempt in the morning.      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

## 2017-01-11 NOTE — PROGRESS NOTES
Pharmacy Medication Reconciliation     Recommendations/Findings:   1) Reviewed discharge summary from [facility] with patient and clarified allopurinol usage. Allopurinol removed. -Clarified PTA med list with discharge summary from [facility] on 1/4/17 and patient. PTA medication list was corrected to the following:     Prior to Admission Medications   Prescriptions Last Dose Informant Patient Reported? Taking?   acetaminophen (TYLENOL ARTHRITIS PAIN) 650 mg CR tablet  Transfer Papers No Yes   Sig: Take 1 Tab by mouth daily. Indications: ARTHRITIC PAIN   aspirin 81 mg chewable tablet  Transfer Papers Yes Yes   Sig: Take 81 mg by mouth daily. carvedilol (COREG) 3.125 mg tablet  Transfer Papers Yes Yes   Sig: Take 3.125 mg by mouth two (2) times daily (with meals). diclofenac (VOLTAREN) 1 % gel  Transfer Papers No Yes   Sig: Apply 4 g to affected area four (4) times daily. furosemide (LASIX) 40 mg tablet  Transfer Papers Yes Yes   Sig: Take 40 mg by mouth daily. losartan (COZAAR) 50 mg tablet  Transfer Papers Yes Yes   Sig: Take 25 mg by mouth daily. multivitamin (ONE A DAY) tablet  Transfer Papers Yes Yes   Sig: Take 1 Tab by mouth daily. omeprazole (PRILOSEC) 40 mg capsule  Transfer Papers No Yes   Sig: Take 1 Cap by mouth daily. potassium chloride (K-DUR, KLOR-CON) 10 mEq tablet  Transfer Papers No Yes   Sig: Take 1 Tab by mouth daily.    tamsulosin (FLOMAX) 0.4 mg capsule  Transfer Papers No Yes   Sig: TAKE ONE CAPSULE BY MOUTH DAILY      Facility-Administered Medications: None          Thank you,  Caleb Riley, PHARMD, BCPS  Contact: [phone]

## 2017-01-11 NOTE — PROGRESS NOTES
Hospitalist Progress Note   Patient Name  Edith Gaston   Admit date:  1/10/2017   Medical Record Number  [id number]    Age  77 y.o. Date of Birth 1940   PCP Melani Vaughn NP    Room Number  209/[facility]     Admission Diagnoses:  Acute on chronic systolic congestive heart failure (HCC)     Assessment/ Plan:   Severe acute on chronic systolic HF  ICD in situ  Hypotension  -SBP now around 90s  -holding all BP  meds  -EF 15% on repeat ECHO, for possible transfer to [facility] tomorrow for LVAD  -seen by Cardiology here     CHERELLE Cr 1.5 on admission, was 1.1 last September  -will observe  -UA neg for infection       Hx of UGIB sec to Gastritis     Ongoing MJ use    CVA s/p TPA  GERD,  Ischemic Cardiomyopathy s/p ICD  Hyperlipidemia      HANSON:    Principal Problem:    Acute on chronic systolic congestive heart failure ([address] (8/9/2010)      Body mass index is 24.9 kg/(m^2). Functional Status  poor   CODE STATUS  Full   Surrogate decision maker: Melodie Hunter   Prophylaxis  Lovenox   Discharge Plan: Home w/Family,       Payor: BLUE CROSS MEDICARE / [facility] / Product Type: [employer] Medicare /    Query No queries noted (chunky button not showing )        Social issues  Updates  Date Comment           Prognosis         Subjective:   \"I feel half way decent\"    Pt complains of lighhtheadedness      Objective:     Physical Exam:    Gen: Lying in bed, looking unwell.   HEENT:  Pink conjunctivae, PERRL, hearing intact to voice, moist mucous membranes  Neck:  Supple, without masses, thyroid non-tender  Resp:  No accessory muscle use, clear breath sounds without wheezes rales or rhonchi  Card:  No murmurs, normal S1, S2 without thrills, bruits or peripheral edema  Abd:  Soft, non-tender, non-distended, normoactive bowel sounds are present, no palpable organomegaly  Lymph:  No cervical adenopathy  Musc:  No cyanosis or clubbing  Skin:  No rashes or ulcers, skin turgor is good  Neuro:  Cranial nerves 3-12 are grossly intact,  strength is 5/5 bilaterally, dorsi / plantarflexion strength is 5/5 bilaterally, follows commands appropriately  Psych:  Alert with good insight. Oriented to person, place, and time       01/11 1901 - 01/12 0700  In: -   Out: 175 [Urine:175]    Intake/Output Summary (Last 24 hours) at 01/11/17 2346  Last data filed at 01/11/17 2111   Gross per 24 hour   Intake                0 ml   Output              525 ml   Net             -525 ml    Weight change: Wt Readings from Last 10 Encounters:   01/11/17 76.5 kg (168 lb 9.6 oz)   01/10/17 77.7 kg (171 lb 6.4 oz)   01/05/17 76.9 kg (169 lb 9.6 oz)   12/22/16 81.8 kg (180 lb 6.4 oz)   11/11/16 81.9 kg (180 lb 9.6 oz)   10/13/16 80.6 kg (177 lb 12.8 oz)   10/05/16 81 kg (178 lb 9.6 oz)   10/04/16 80.3 kg (177 lb)   09/14/16 79.6 kg (175 lb 6.4 oz)   09/04/16 77.5 kg (170 lb 13.7 oz)        Relevant informations: Other medical conditions listed in this following active hospital problem list section; all of these and other pertinent data were taken into consideration when treatment plan is developed and customized to this patient's unique overall circumstances and needs.    Patient Active Problem List    Diagnosis Date Noted    Stenosis of both internal carotid arteries 10/04/2016    Cerebrovascular accident (CVA) due to occlusion of right middle cerebral artery ([facility] [address] 09/02/2016    Upper GI bleed 03/30/2016    Rectus diastasis 12/11/2014    Anxiety 10/24/2013    Anemia 03/03/2011    Chest discomfort 03/03/2011    Pacemaker 03/03/2011    Dizziness 03/03/2011    Bronchospasm 11/16/2010    Acute on chronic systolic congestive heart failure ([facility] [address] 08/09/2010    HTN (hypertension) 08/09/2010    SOB (shortness of breath) 08/09/2010    PPD positive 08/09/2010          Data Review:   Recent Days:  Recent Labs      01/11/17   0316  01/10/17   1519   WBC  5.6  5.1   HGB  11.9*  12.3   HCT  37.3  37.7 PLT  189  219     Recent Labs      01/11/17 2045  01/11/17   0316  01/10/17   1519   NA   --   142  143   K  4.4  3.8  4.4   CL   --   107  107   CO2   --   27  28   GLU   --   85  103*   BUN   --   25*  24*   CREA   --   1.41*  1.46*   CA   --   8.2*  8.5   MG  2.0   --    --    ALB   --    --   3.5   TBILI   --    --   0.6   SGOT   --    --   20   ALT   --    --   25     Lab Results   Component Value Date/Time    TSH 0.95 09/03/2016 08:02 PM     ______________________________________________________________________________________________________    Medications reviewed     Current Facility-Administered Medications   Medication Dose Route Frequency    sodium chloride (NS) flush 5-10 mL  5-10 mL IntraVENous Q8H    sodium chloride (NS) flush 5-10 mL  5-10 mL IntraVENous PRN    enoxaparin (LOVENOX) injection 40 mg  40 mg SubCUTAneous Q24H    allopurinol (ZYLOPRIM) tablet 100 mg  100 mg Oral DAILY    pantoprazole (PROTONIX) tablet 40 mg  40 mg Oral ACB    aspirin chewable tablet 81 mg  81 mg Oral DAILY    sodium chloride 0.9 % bolus infusion 250 mL  250 mL IntraVENous PRN       ______________________________________________________________________________________________________  Care Plan discussed with: Patient/Family  ____________________________________________________________________________________________________    High complexity decision making was performed for this patient who is at high risk for decompensation with multiple organ involvement. Today total floor/unit time was 30 minutes while caring for this patient and greater than 50% of that time was spent with patient (and/or family) discussing patients clinical issues.   ______________________________________________________________________________________________________  Kayla Terrell MD                      1/11/2017

## 2017-01-11 NOTE — PROGRESS NOTES
RRAT Score: 20  Initial Assessment: CM reviewed chart and met with patient for discharge planning. CM verified patients address and contact number as correct on the facesheet. Pt presented to ED with pulmonary hypertension. Patient is currently living with his niece and will return at discharge. He reported that he will have transportation at discharge. Patient is unemployed. Patient consented for CM to make appointment arrangements. Patients PCP is NP Jeramie Redd and he has an appointment scheduled for 1/12/17 at 1pm. CM will reschedule patient's appointment due to his hospitalization. Patient is also seen by Dr. Yokasta Garrison. Patient will not need assistance with obtaining medications. Patient voiced that he does have medications at home. Medications refills will likely not be needed on discharge. He uses [facility] on [address] to obtain his medications. CM reviewed OBS form with patient and placed signed copy in his chart. Emergency Contact:   Shari Murguia (son) [phone]  Pertinent Medical Hx: see H&P     Transition Plan: Home with outpatient services. Involve patient/caregiver in assessment, planning, education and implement of intervention. Yes. CM will continue to follow case for discharge planning. CM daily patient care huddles/interdisciplinary rounds. Rounded with IDT. CM will handoff to [address] [place] [address] or PCP practice. CM evaluated for [facility] or [address] care coordination of resources. CM will further assess if needed. Care Management Interventions  PCP Verified by CM: Yes  Mode of Transport at Discharge:  Other (see comment) (family)  Transition of Care Consult (CM Consult): Discharge Planning  Discharge Durable Medical Equipment: No  Physical Therapy Consult: No  Occupational Therapy Consult: No  Speech Therapy Consult: No  Current Support Network: Relative's Home (lives with seth)  Confirm Follow Up Transport: Self  Plan discussed with Pt/Family/Caregiver: Yes  Discharge Location  Discharge Placement: Home with outpatient services    JUAN Carlin, Supervisee in Social Work  [phone]

## 2017-01-11 NOTE — PROGRESS NOTES
Pt's bp 73/64 on R arm taken automatically. Reassessed on L arm manually with reading of 82/60. Pt asymptomatic. Notified Dr. Arnoldo Michel who gave order to continue checking bp and rest of vital signs every hour. 1245- Pt's bp 95/70 on L arm taken automatically. Pt complaining of light dizziness. Notified Dr. Arnoldo Michel who discontinued pt's coreg and told to continue checking pt's VS every hour. 1430- Pt's bp 87/66 taken automatically on R arm. Pt asymptomatic. Notified Dr. Arnoldo Michel. 1600- Pt's bp 89/65 taken automatically on L arm. Pt complaining of mild headache and fatigue. Notified Dr. Arnoldo Michel who gave order to check pt's VS every 4 hours.

## 2017-01-11 NOTE — PROGRESS NOTES
Cardiology:    Mr. Farrah Pena feels better with no further postural lightheadedness. He is ambulatory without symptoms and he is comfortable supine. He denies chest pain and he slept well. BP is 104/76 today. He has nonpulsatile JVD when supine that disappears on inspiration. He is probably still a bit volume contracted. Lungs are clear. The only readily audible heart sound is P2. He is mildly tachycardic at rest. There is no edema, abdomen is unremarkable. There have been no arrhythmias. His last echo (10/28/16) showed approximately a 30% LVEF with a variable reports regarding RV function. The highest recent documented PA pressure was about 40 mm. We will repeat a limited echo today to reassess RV function and PA pressure estimate before proceeding with PA HTN referral or empiric startup of sildenafil. We can restart his coreg today and likely discharge later.      Lakisha Oakley MD

## 2017-01-11 NOTE — PROGRESS NOTES
Pt has Σκα[address] 233 device:  Model # [id number]                                                       Serial # [id number]    ONE LEAD: Not identified:  Model # [id number]                                               Serial # [id number]  Implant Date: 03/05/2009    Implant Md: Dr Tej Austin    Implant site[place] [facility]    Phone number for [facility]: [phone]

## 2017-01-12 ENCOUNTER — HOME HEALTH ADMISSION (OUTPATIENT)
Dept: HOME HEALTH SERVICES | Facility: HOME HEALTH | Age: 77
End: 2017-01-12

## 2017-01-12 VITALS
OXYGEN SATURATION: 98 % | HEIGHT: 69 IN | TEMPERATURE: 97.8 F | RESPIRATION RATE: 18 BRPM | DIASTOLIC BLOOD PRESSURE: 86 MMHG | WEIGHT: 169.2 LBS | HEART RATE: 96 BPM | BODY MASS INDEX: 25.06 KG/M2 | SYSTOLIC BLOOD PRESSURE: 111 MMHG

## 2017-01-12 PROBLEM — I27.20 PULMONARY HYPERTENSION (HCC): Status: ACTIVE | Noted: 2017-01-12

## 2017-01-12 LAB
ANION GAP BLD CALC-SCNC: 8 MMOL/L (ref 5–15)
BASOPHILS # BLD AUTO: 0 K/UL (ref 0–0.1)
BASOPHILS # BLD: 0 % (ref 0–1)
BUN SERPL-MCNC: 21 MG/DL (ref 6–20)
BUN/CREAT SERPL: 18 (ref 12–20)
CALCIUM SERPL-MCNC: 8.4 MG/DL (ref 8.5–10.1)
CHLORIDE SERPL-SCNC: 106 MMOL/L (ref 97–108)
CO2 SERPL-SCNC: 26 MMOL/L (ref 21–32)
CREAT SERPL-MCNC: 1.16 MG/DL (ref 0.7–1.3)
EOSINOPHIL # BLD: 0.2 K/UL (ref 0–0.4)
EOSINOPHIL NFR BLD: 3 % (ref 0–7)
ERYTHROCYTE [DISTWIDTH] IN BLOOD BY AUTOMATED COUNT: 14 % (ref 11.5–14.5)
GLUCOSE SERPL-MCNC: 110 MG/DL (ref 65–100)
HCT VFR BLD AUTO: 37 % (ref 36.6–50.3)
HGB BLD-MCNC: 12.2 G/DL (ref 12.1–17)
LYMPHOCYTES # BLD AUTO: 56 % (ref 12–49)
LYMPHOCYTES # BLD: 3.6 K/UL (ref 0.8–3.5)
MCH RBC QN AUTO: 30.5 PG (ref 26–34)
MCHC RBC AUTO-ENTMCNC: 33 G/DL (ref 30–36.5)
MCV RBC AUTO: 92.5 FL (ref 80–99)
MONOCYTES # BLD: 0.6 K/UL (ref 0–1)
MONOCYTES NFR BLD AUTO: 9 % (ref 5–13)
NEUTS SEG # BLD: 2 K/UL (ref 1.8–8)
NEUTS SEG NFR BLD AUTO: 32 % (ref 32–75)
PLATELET # BLD AUTO: 203 K/UL (ref 150–400)
POTASSIUM SERPL-SCNC: 4.7 MMOL/L (ref 3.5–5.1)
RBC # BLD AUTO: 4 M/UL (ref 4.1–5.7)
SODIUM SERPL-SCNC: 140 MMOL/L (ref 136–145)
WBC # BLD AUTO: 6.4 K/UL (ref 4.1–11.1)

## 2017-01-12 PROCEDURE — 36415 COLL VENOUS BLD VENIPUNCTURE: CPT | Performed by: STUDENT IN AN ORGANIZED HEALTH CARE EDUCATION/TRAINING PROGRAM

## 2017-01-12 PROCEDURE — 74011250637 HC RX REV CODE- 250/637: Performed by: INTERNAL MEDICINE

## 2017-01-12 PROCEDURE — 65270000029 HC RM PRIVATE

## 2017-01-12 PROCEDURE — 85025 COMPLETE CBC W/AUTO DIFF WBC: CPT | Performed by: STUDENT IN AN ORGANIZED HEALTH CARE EDUCATION/TRAINING PROGRAM

## 2017-01-12 PROCEDURE — 80048 BASIC METABOLIC PNL TOTAL CA: CPT | Performed by: STUDENT IN AN ORGANIZED HEALTH CARE EDUCATION/TRAINING PROGRAM

## 2017-01-12 PROCEDURE — 99218 HC RM OBSERVATION: CPT

## 2017-01-12 RX ADMIN — ALLOPURINOL 100 MG: 100 TABLET ORAL at 11:49

## 2017-01-12 RX ADMIN — PANTOPRAZOLE SODIUM 40 MG: 40 TABLET, DELAYED RELEASE ORAL at 08:23

## 2017-01-12 RX ADMIN — Medication 10 ML: at 04:48

## 2017-01-12 RX ADMIN — ASPIRIN 81 MG 81 MG: 81 TABLET ORAL at 08:23

## 2017-01-12 NOTE — ROUTINE PROCESS
..Bedside and Verbal shift change report given to Denilson Carranza RN (oncoming nurse) by Cheryle Dexter RN (offgoing nurse). Report included the following information SBAR, Kardex, MAR and Recent Results, and cardiac rhythm (NSR w/ PVCs).

## 2017-01-12 NOTE — INTERDISCIPLINARY ROUNDS
IDR - CM attended IDR with medical team.  Patient is being discharged today with follow up scheduled at [address] [place] [address] and [facility]plant [facility] (telephone number [phone]) for Monday, January 23, 2017 at 9:45 am with Dr. Demetria Valle. Also has a PCP appointment scheduled for 1/17/17 at 2:20 pm with Gisella Sullivan NP and an appointment with Dr. Bailee Willson on 1/16/17 at 2:40 pm.    CM faxed medical record information to [id number] [facility] at fax # [phone].

## 2017-01-12 NOTE — PROGRESS NOTES
BP  Has improved, pt is not on ru medications now. Asymptomatic. Can be allowed home.   Will floow with Dr Rosen and Advanced HF clinic at [facility]

## 2017-01-12 NOTE — PROGRESS NOTES
Letter of Status Determination: Upgrade from Observation to Inpatient Status           Pt Name:  Amelia Turner   MR#  [id number]   [facility]#   [id number]   [address]  209/01  @ [address] date  1/10/2017  2:52 PM   Current Attending Physician  Keegan Solis MD   Insurance  Payor: [employer] / Plan: [facility] / Product Type: Managed Care Medicare /    Principal diagnosis  Acute on chronic systolic congestive heart failure [facility])    Clinicals  77 y.o. y.o  male admitted with pt is noted to have complex cardiac issues who presented from his PCPs office noted to have severe hypotension possibly contributed by recent addition of coreg and diuretics. Cardiologist Dr. Krystin Arellano reports that the patient is fully compliant. He has communicated with staff at [facility] and pt is waiting for possible LVAD insertion to help with his severe cardiomyopathy with documented severe Right heart failure, pulmonary hypertension and severely depressed EF ~15%. STATUS DETERMINATION  Based on documented presenting clinical data, comorbid conditions, high risk of adverse events and deterioration, it is our judgement that the patient's status should be upgraded from OBSERVATION to INPATIENT status. It is our recommendation that the patient's status is upgraded from OBSERVATION to INPATIENT status.         Additional comments         Brodie Ngo MD MPH FACP     Physician Advisor    [address]     President Medical Staff, [address] [place] [address]    Cell  [phone]

## 2017-01-12 NOTE — DISCHARGE INSTRUCTIONS
Low Blood Pressure: Care Instructions  Your Care Instructions  Blood pressure is a measurement of the force of the blood against the walls of the blood vessels during and after each beat of the heart. Low blood pressure (hypotension) means that your blood pressure is much lower than normal. Some people, especially young, slim women, may have slightly low blood pressure without symptoms. However, in many people, low blood pressure can cause symptoms such as dizziness or lightheadedness. When your blood pressure is too low, your heart, brain, and other organs do not get enough blood. Low blood pressure can be caused by many things, including heart problems and some medicines. Uncontrolled diabetes can cause your blood pressure to drop, and so can a severe allergic reaction or infection. Another cause is dehydration, which is when your body loses too much fluid. Treatment for low blood pressure depends on the cause. Follow-up care is a key part of your treatment and safety. Be sure to make and go to all appointments, and call your doctor if you are having problems. It's also a good idea to know your test results and keep a list of the medicines you take. How can you care for yourself at home? · Drink plenty of fluids, enough so that your urine is light yellow or clear like water. If you have kidney, heart, or liver disease and have to limit fluids, talk with your doctor before you increase the amount of fluids you drink. · Be safe with medicines. Call your doctor if you think you are having a problem with your medicine. You will get more details on the specific medicines your doctor prescribes. · Stand up or get out of bed very slowly to allow your body to adjust.  · Get plenty of rest.  · Do not smoke. Smoking increases your risk of heart attack. If you need help quitting, talk to your doctor about stop-smoking programs and medicines. These can increase your chances of quitting for good.   · Limit alcohol to 2 drinks a day for men and 1 drink a day for women. Alcohol may interfere with your medicine. In addition, alcohol can make your low blood pressure worse by causing your body to lose water. When should you call for help? Call 911 anytime you think you may need emergency care. For example, call if:  · You have symptoms of a heart attack. These may include:  ¨ Chest pain or pressure, or a strange feeling in the chest.  ¨ Sweating. ¨ Shortness of breath. ¨ Nausea or vomiting. ¨ Pain, pressure, or a strange feeling in the back, neck, jaw, or upper belly or in one or both shoulders or arms. ¨ Lightheadedness or sudden weakness. ¨ A fast or irregular heartbeat. After you call 911, the  may tell you to chew 1 adult-strength or 2 to 4 low-dose aspirin. Wait for an ambulance. Do not try to drive yourself. · You have symptoms of a stroke. These may include:  ¨ Sudden numbness, tingling, weakness, or loss of movement in your face, arm, or leg, especially on only one side of your body. ¨ Sudden vision changes. ¨ Sudden trouble speaking. ¨ Sudden confusion or trouble understanding simple statements. ¨ Sudden problems with walking or balance. ¨ A sudden, severe headache that is different from past headaches. · You passed out (lost consciousness). Call your doctor now or seek immediate medical care if:  · You are dizzy or lightheaded, or you feel like you may faint. · You have signs of needing more fluids. You have sunken eyes and a dry mouth, and you pass only a little dark urine. · You cannot keep down fluids. Watch closely for changes in your health, and be sure to contact your doctor if:  · You do not get better as expected. Where can you learn more? Go to http://jose-bernadine.info/. Enter C304 in the search box to learn more about \"Low Blood Pressure: Care Instructions. \"  Current as of: April 21, 2016  Content Version: 11.1  © 6639-2830 [employer].  Care instructions adapted under license by ???? (which disclaims liability or warranty for this information). If you have questions about a medical condition or this instruction, always ask your healthcare professional. [address] any warranty or liability for your use of this information.

## 2017-01-12 NOTE — PROGRESS NOTES
Removed pt's IV catheter with tip intact and took pt of tele monitor. Reviewed discharge instructions with pt while providing opportunity for questions. Pt being discharged with no new prescriptions.

## 2017-01-13 ENCOUNTER — HOME CARE VISIT (OUTPATIENT)
Dept: HOME HEALTH SERVICES | Facility: HOME HEALTH | Age: 77
End: 2017-01-13

## 2017-01-13 NOTE — DISCHARGE SUMMARY
Physician Discharge Summary     Pt Name  Anton Mathew   Admit date:  1/10/2017   Discharge date and time:  1/12/2017   Room Number  586/81    Medical Record Number  [id number] @ [address]   Age  77 y.o. Date of Birth 1940   PCP Amirah Hernandez NP     Admission Diagnoses:                        Acute on chronic systolic congestive heart failure [facility])     Hospital Problems  Date Reviewed: 1/5/2017          Codes Class Noted POA    Pulmonary hypertension ([facility] ICD-10-CM: I27.2  ICD-9-CM: 416.8  1/12/2017 Unknown        * (Principal)Acute on chronic systolic congestive heart failure ([facility] ICD-10-CM: I50.23  ICD-9-CM: 428.23, 428.0  8/9/2010 Yes               No Known Allergies     Excerpt from HPI :   Mr. Mary Carr is a 77 y.o. BLACK OR  male with PMX of CVA s/p TPA, Gastritis, GERD, Ischemic Cardiomyopathy s/p ICD, Hyperlipidemia, HTN, Arthritis who presented to the Emergency Department today because of low BP. Patient was at his PCP office for follow up and was transferred to ED because of low blood pressure readings. As per notes,pt's BP 79/57 and 88/60(repeat) in the office. PCP reports pt's is asymptomatic and normal BP is usually around 100's/60's. He was recently discharged from [facility] and Coreg was restarted . As per my d/w Dr Annemarie Butler was supposed to f/u with Dr Cirilo Chilel at [facility]. Pt has severe Pulmonary HTN and he recommends pt should be transferred to [place] for invasive pulmonary hypertension management. But [facility] [place] to their hospital at this time because of diversion.           Hospital Course: This pt was admitted with  Acute on chronic systolic congestive heart failure ([facility] on 1/10/2017. Severe acute on chronic systolic HF  ICD in situ  Hypotension  -pt admitted with low BP, all medications had to be held. With the re addition of Coreg 3.125 yesterday, pt had persistent SBps in the 90s.  That was stopped, in the late morning today, BP Up to the 405'O systolic  -sildenafil could not therefore be tried  -EF 15% on repeat ECHO, for [place] [facility] [place], however no beds are available  -seen by Cardiology here  -no ace-i/BB b/c of hypotension      CHERELLE Cr 1.5 on admission, was 1.1 last September  - c/w medical renal disease and prostatomegaly  -UA neg for infection         Hx of UGIB sec to Gastritis      Ongoing MJ use     CVA s/p TPA  GERD,  Ischemic Cardiomyopathy s/p ICD  Hyperlipidemia          Condition at the time of discharge improved and stable .      Query:   Treatment team[de-identified] Treatment Team: Consulting Provider: Tu Garcia MD; Consulting Provider: Noemi Rees MD; Care Manager: Сергей Fish       Other Pertinent data:   TODAY's CLINICAL FINDINGS:   Visit Vitals    /86 (BP 1 Location: Left arm, BP Patient Position: At rest)    Pulse 96    Temp 97.8 °F (36.6 °C)    Resp 18    Ht 5' 9\" (1.753 m)    Wt 76.7 kg (169 lb 3.2 oz)    SpO2 98%    BMI 24.99 kg/m2      Wt Readings from Last 10 Encounters:   01/12/17 76.7 kg (169 lb 3.2 oz)   01/10/17 77.7 kg (171 lb 6.4 oz)   01/05/17 76.9 kg (169 lb 9.6 oz)   12/22/16 81.8 kg (180 lb 6.4 oz)   11/11/16 81.9 kg (180 lb 9.6 oz)   10/13/16 80.6 kg (177 lb 12.8 oz)   10/05/16 81 kg (178 lb 9.6 oz)   10/04/16 80.3 kg (177 lb)   09/14/16 79.6 kg (175 lb 6.4 oz)   09/04/16 77.5 kg (170 lb 13.7 oz)       Physical Exam:    Gen:  Well-developed, well-nourished, in no acute distress  HEENT:  Pink conjunctivae, PERRL, hearing intact to voice, moist mucous membranes  Neck:  Supple, without masses, thyroid non-tender  Resp:  No accessory muscle use, clear breath sounds without wheezes rales or rhonchi  Card:  No murmurs, normal S1, S2 without thrills, bruits or peripheral edema  Abd:  Soft, non-tender, non-distended, normoactive bowel sounds are present, no palpable organomegaly  Lymph:  No cervical adenopathy  Musc:  No cyanosis or clubbing  Skin:  No rashes or ulcers, skin turgor is good  Neuro:  Cranial nerves 3-12 are grossly intact,  strength is 5/5 bilaterally, dorsi / plantarflexion strength is 5/5 bilaterally, follows commands appropriately  Psych:  Alert with good insight. Oriented to person, place, and time       Disposition:Home. Diet: Regular Diet   Care Plan discussed with: Patient/Family   Follow up   Follow-up Information     Follow up With Details Comments Contact Info    [facility]  You will be contacted by a health  to schedule a home visit. Cha Knight MD  You are scheduled to see Dr. Roy Ormond on 1/16/17 at 2:40 pm. [address] [id number] [phone]      Melinda Layton NP Go on 1/17/2017 please go to your follow up appointment at 2:20 [address]      Karol Carlos MD  You are scheduled to see Dr. Karol Carlos at the [id number] [place] [address] on 1/23/17 at 9:45 am.  Please arrive 30 minutes early with a picture ID and your insurance card. [address] [place] [address]  [phone]           Discharge Medication List as of 1/12/2017 12:19 PM      CONTINUE these medications which have NOT CHANGED    Details   aspirin 81 mg chewable tablet Take 81 mg by mouth daily. , Historical Med      furosemide (LASIX) 40 mg tablet Take 40 mg by mouth daily. , Historical Med      multivitamin (ONE A DAY) tablet Take 1 Tab by mouth daily. , Historical Med      tamsulosin (FLOMAX) 0.4 mg capsule TAKE ONE CAPSULE BY MOUTH DAILY, Normal**Patient requests 90 days supply**Disp-90 Cap, R-3      omeprazole (PRILOSEC) 40 mg capsule Take 1 Cap by mouth daily. , Normal, Disp-90 Cap, R-3      acetaminophen (TYLENOL ARTHRITIS PAIN) 650 mg CR tablet Take 1 Tab by mouth daily.  Indications: ARTHRITIC PAIN, Normal, Disp-30 Tab, R-11      potassium chloride (K-DUR, KLOR-CON) 10 mEq tablet Take 1 Tab by mouth daily. , Normal**Patient requests 90 days supply**Disp-90 Tab, R-3         STOP taking these medications       losartan (COZAAR) 50 mg tablet Comments:   Reason for Stopping:         carvedilol (COREG) 3.125 mg tablet Comments:   Reason for Stopping:         allopurinol (ZYLOPRIM) 100 mg tablet Comments:   Reason for Stopping:         diclofenac (VOLTAREN) 1 % gel Comments:   Reason for Stopping:                  Significant Diagnostic Studies:   Recent Labs      01/12/17 0446 01/11/17 0316   WBC  6.4  5.6   HGB  12.2  11.9*   HCT  37.0  37.3   PLT  203  189     Recent Labs      01/12/17   0446  01/11/17   2045  01/11/17   1058  01/11/17   0316  01/10/17   1519   NA  140   --    --   142  143   K  4.7  4.4   --   3.8  4.4   CL  106   --    --   107  107   CO2  26   --    --   27  28   BUN  21*   --    --   25*  24*   CREA  1.16   --    --   1.41*  1.46*   GLU  110*   --    --   85  103*   CA  8.4*   --    --   8.2*  8.5   MG   --   2.0   --    --    --    URICA   --    --   6.2   --    --      Recent Labs      01/10/17   1519   SGOT  20   ALT  25   AP  68   TBILI  0.6   TP  7.2   ALB  3.5   GLOB  3.7     No results for input(s): INR, PTP, APTT in the last 72 hours. No lab exists for component: INREXT, INREXT   No results for input(s): FE, TIBC, PSAT, FERR in the last 72 hours. No results for input(s): PH, PCO2, PO2 in the last 72 hours. No results for input(s): CPK, CKMB in the last 72 hours.     No lab exists for component: TROPONINI  Lab Results   Component Value Date/Time    Glucose (POC) 92 09/02/2016 04:24 PM    Glucose (POC) 103 03/03/2011 12:05 PM    Glucose  02/28/2014 11:11 AM    Glucose  05/07/2012 11:32 AM    Glucose  03/04/2011 01:23 PM    Glucose  03/03/2011 09:40 AM           ________________________________________________________________________    Shelbi Sneddon Time for face to face meeting with the pt and examination including care coordination with staff and chart review (>50% of total time listed here )  approx.  25 min]    ________________________________________________________________________    Monet Jaime MD  1/12/2017

## 2017-01-13 NOTE — TELEPHONE ENCOUNTER
On 1/13/17 at 1435 hrs the pt was able to provide the appropriate pt identifiers. The pt was given the listed follow up appts. The pt has not spoken with [facility] at this point. The pt reports he has not made [facility] appt at this point but will do so; pt provided the number. The pt did not communicate any complaint or concerns at this time.

## 2017-01-16 ENCOUNTER — OFFICE VISIT (OUTPATIENT)
Dept: CARDIOLOGY CLINIC | Age: 77
End: 2017-01-16

## 2017-01-16 VITALS
HEIGHT: 69 IN | DIASTOLIC BLOOD PRESSURE: 85 MMHG | HEART RATE: 96 BPM | SYSTOLIC BLOOD PRESSURE: 126 MMHG | BODY MASS INDEX: 25.06 KG/M2 | OXYGEN SATURATION: 98 % | WEIGHT: 169.2 LBS

## 2017-01-16 DIAGNOSIS — R06.02 SOB (SHORTNESS OF BREATH): ICD-10-CM

## 2017-01-16 DIAGNOSIS — I27.20 PULMONARY HYPERTENSION (HCC): ICD-10-CM

## 2017-01-16 DIAGNOSIS — R63.0 POOR APPETITE: Primary | ICD-10-CM

## 2017-01-16 DIAGNOSIS — R63.4 ABNORMAL WEIGHT LOSS: ICD-10-CM

## 2017-01-16 DIAGNOSIS — I50.810 RIGHT HEART FAILURE (HCC): ICD-10-CM

## 2017-01-16 RX ORDER — CARVEDILOL 3.12 MG/1
TABLET ORAL
Refills: 1 | COMMUNITY
Start: 2017-01-04 | End: 2017-02-14 | Stop reason: ALTCHOICE

## 2017-01-16 RX ORDER — ASPIRIN 81 MG/1
TABLET ORAL
COMMUNITY
Start: 2016-12-02

## 2017-01-16 RX ORDER — LOSARTAN POTASSIUM 50 MG/1
TABLET ORAL
Refills: 12 | COMMUNITY
Start: 2016-12-09 | End: 2017-02-14

## 2017-01-16 RX ORDER — DICLOFENAC SODIUM 10 MG/G
GEL TOPICAL
Refills: 11 | COMMUNITY
Start: 2017-01-05 | End: 2017-02-14

## 2017-01-16 NOTE — PROGRESS NOTES
[facility]   [address] [facility], [address] [place] [address]                                          NEW PATIENT HPI/FOLLOW-UP      NAME:  Wallace Turner   :   1940   MRN:   [id number]   PCP:  Andrew Alejandro NP           Subjective: The patient is a 77y.o. year old male  who returns for a routine follow-up after recent hospitalization for CHF. Since the last visit, patient reports \"i feel terrible\" all the time. He reports SOB at rest, no appetite and significant weight loss. Pt states his weight at home this morning was 155 lbs. He has tried Ensure to help his appetite and to give him some calories, which he states helps some and is asking for a prescription. He has noticed weight loss in his face as well as abdomen. He reports feeling dehydrated/dry mouth and fatigued all of the time, even when he is resting. He reports upper abdominal pain that feels like gas. Nothing improves this pain. He reports occasional palpitations and racing heart. Denies chest pain, LE edema, medication intolerance, wheezing, syncope, dizziness or light headedness. Doing poorly, per pt. Review of Systems  General:  +excessive weight loss. unable to conduct ADL's. Respiratory: +shortness of breath, MCCALLUM, Denies wheezing or stridor.   Cardiovascular: + palpitations Denies precordial pain,, edema or PND  Gastrointestinal: +poor appetite, abdominal pain Denies belching, indigestion  Peripheral vascular: Denies claudication, leg cramps  Neuropsychiatric: +fatigue, Denies paresthesias,tingling,numbness,anxiety,depression  Musculoskeletal: Denies pain,tenderness, soreness,swelling      Past Medical History   Diagnosis Date    Arthritis     CHF (congestive heart failure) (HCC)     Dizziness 3/3/2011    GERD (gastroesophageal reflux disease)     Hypercholesterolemia     Hypertension     ICD (implantable cardiac defibrillator), single, in situ 3/3/2011    Rectus diastasis 2014    Stroke ([facility] Patient Active Problem List    Diagnosis Date Noted    Pulmonary hypertension ([facility] 01/12/2017    Stenosis of both internal carotid arteries 10/04/2016    Cerebrovascular accident (CVA) due to occlusion of right middle cerebral artery ([facility] 09/02/2016    Upper GI bleed 03/30/2016    Rectus diastasis 12/11/2014    Anxiety 10/24/2013    Anemia 03/03/2011    Chest discomfort 03/03/2011    Pacemaker 03/03/2011    Dizziness 03/03/2011    Bronchospasm 11/16/2010    Acute on chronic systolic congestive heart failure ([facility] 08/09/2010    HTN (hypertension) 08/09/2010    SOB (shortness of breath) 08/09/2010    PPD positive 08/09/2010      Past Surgical History   Procedure Laterality Date    Hx orthopaedic       left knee rep 2005, rt kneerepl 2006    Pr cardiac surg procedure unlist       defibulator 2009    Pr egd balloon dilation esophagus <30 mm diam  10/5/2012          Upper gi endoscopy,biopsy  3/31/2016          Colonoscopy,remv lesn,snare  4/1/2016           No Known Allergies   Family History   Problem Relation Age of Onset    Heart Disease Mother     Heart Disease Sister       Social History     Social History    Marital status:      Spouse name: N/A    Number of children: N/A    Years of education: N/A     Occupational History    Not on file. Social History Main Topics    Smoking status: Former Smoker    Smokeless tobacco: Never Used    Alcohol use 1.5 oz/week     3 Cans of beer per week    Drug use: Yes     Special: Marijuana      Comment: twice weekly    Sexual activity: Not on file     Other Topics Concern    Not on file     Social History Narrative    4/2016: lives with son and two grandchildren. Did 1 year of college. Used to work as . Current Outpatient Prescriptions   Medication Sig    furosemide (LASIX) 40 mg tablet Take 40 mg by mouth every other day.  multivitamin (ONE A DAY) tablet Take 1 Tab by mouth daily.     tamsulosin (FLOMAX) 0.4 mg capsule TAKE ONE CAPSULE BY MOUTH DAILY    omeprazole (PRILOSEC) 40 mg capsule Take 1 Cap by mouth daily.  acetaminophen (TYLENOL ARTHRITIS PAIN) 650 mg CR tablet Take 1 Tab by mouth daily. Indications: ARTHRITIC PAIN    potassium chloride (K-DUR, KLOR-CON) 10 mEq tablet Take 1 Tab by mouth daily.  carvedilol (COREG) 3.125 mg tablet TK 1 T PO  BID    losartan (COZAAR) 50 mg tablet TK 1 T PO D    diclofenac (VOLTAREN) 1 % gel NAKIA 4 GRAMS EXT AA QID    aspirin delayed-release 81 mg tablet      No current facility-administered medications for this visit. I have reviewed the nurses notes, vitals, problem list, allergy list, medical history, family medical, social history and medications. Objective:     Physical Exam:     Vitals:    01/16/17 1500 01/16/17 1503   BP: 96/64 126/85   Pulse: 96    SpO2: 98%    Weight: 169 lb 3.2 oz (76.7 kg)    Height: 5' 9\" (1.753 m)     Body mass index is 24.99 kg/(m^2). General: WDWN, dyspneic pt in NAD. HEENT: No carotid bruits, no JVD, trach is midline. Heart:  Normal S1/S2 negative S3 or S4. Regular, no murmur, gallop or rub.   Respiratory: Clear bilaterally, no wheezing or rales  Abdomen:   Soft, improved distension, non-tender, bowel sounds are active.   Extremities:  No edema, normal cap refill, no cyanosis. Neuro: A&Ox3, speech clear, gait stable. Skin: Skin color is normal. No rashes or lesions. No diaphoresis.   Vascular: 2+ pulses symmetric in all extremities    Data Review:       Cardiographics:    Cardiology Labs: reviewed    Results for orders placed or performed during the hospital encounter of 01/10/17   EKG, 12 LEAD, INITIAL   Result Value Ref Range    Ventricular Rate 87 BPM    Atrial Rate 87 BPM    P-R Interval 142 ms    QRS Duration 108 ms    Q-T Interval 404 ms    QTC Calculation (Bezet) 486 ms    Calculated P Axis 50 degrees    Calculated R Axis -42 degrees    Calculated T Axis 103 degrees    Diagnosis       Sinus rhythm with occasional premature ventricular complexes  Possible Left atrial enlargement  Left axis deviation  Incomplete left bundle branch block  T wave abnormality, consider lateral ischemia  Prolonged QT  When compared with ECG of 03-SEP-2016 12:22,  T wave inversion now evident in Lateral leads  Confirmed by Tapan Snider ([id number]) on 1/10/2017 8:19:35 PM         Lab Results   Component Value Date/Time    Cholesterol, total 126 09/03/2016 05:13 AM    HDL Cholesterol 44 09/03/2016 05:13 AM    LDL, calculated 68 09/03/2016 05:13 AM    Triglyceride 70 09/03/2016 05:13 AM    CHOL/HDL Ratio 2.9 09/03/2016 05:13 AM       Lab Results   Component Value Date/Time    Sodium 140 01/12/2017 04:46 AM    Potassium 4.7 01/12/2017 04:46 AM    Chloride 106 01/12/2017 04:46 AM    CO2 26 01/12/2017 04:46 AM    Anion gap 8 01/12/2017 04:46 AM    Glucose 110 01/12/2017 04:46 AM    BUN 21 01/12/2017 04:46 AM    Creatinine 1.16 01/12/2017 04:46 AM    BUN/Creatinine ratio 18 01/12/2017 04:46 AM    GFR est AA >60 01/12/2017 04:46 AM    GFR est non-AA >60 01/12/2017 04:46 AM    Calcium 8.4 01/12/2017 04:46 AM    Bilirubin, total 0.6 01/10/2017 03:19 PM    ALT 25 01/10/2017 03:19 PM    AST 20 01/10/2017 03:19 PM    Alk. phosphatase 68 01/10/2017 03:19 PM    Protein, total 7.2 01/10/2017 03:19 PM    Albumin 3.5 01/10/2017 03:19 PM    Globulin 3.7 01/10/2017 03:19 PM    A-G Ratio 0.9 01/10/2017 03:19 PM          Assessment:       ICD-10-CM ICD-9-CM    1. Poor appetite R63.0 783.0    2. Abnormal weight loss R63.4 783.21    3. Right heart failure (HCC) I50.9 428.0    4. Pulmonary hypertension (HCC) I27.2 416.8    5. SOB (shortness of breath) R06.02 786.05          Discussion: Patient presents at this time with stable but fragile right sided heart failure and pulmonary hypertension. Pt has significant at rest/NYHA IV sx, failing appetite and weight loss of 14 lbs.  Pt notes he has been contacted by [facility] Advanced Heart Failure team with appointment next week. Encouraged to discuss pulmonary hypertension at that visit as well, as Dr. Lu Kehr is already aware of pt and we believe he could benefit from left/right heart cath with drug (NO) challenge. Plan:   Discussed with Dr. Rosen    1. Continue same meds. -- But decrease Lasix to 40 mg EVERY OTHER DAY   -- Get Ensure OTC as meal supplement     2. Encouraged to keep appointment with Dr. Faina Salas at [facility] Failure on 1/23/17    3. Follow up: soon after establishing care with [facility] Advanced Heart Failure clinic and Pulmonary Hypertension clinic  (~ 1 month)    I have discussed the diagnosis with the patient and the intended plan as seen in the above orders. The patient has received an after-visit summary and questions were answered concerning future plans. I have discussed any concerning medication side effects and warnings with the patient as well.     Kushal Aquino PA-C  1/16/2017

## 2017-01-16 NOTE — MR AVS SNAPSHOT
Visit Information Date & Time Provider Department Dept. Phone Encounter #  
 1/16/2017  2:40 PM Brandon Dennis MD [facility] at [facility] [address] 68 58 82 Your Appointments 1/17/2017  2:20 PM  
ROUTINE CARE with Feli Mac NP  
[address] [facility] [address] [facility]) Appt Note: fu from er visit; fu from er visit r/s  
 [address] [facility] [address]  
[phone]  
  
   
 [address]  
  
    
 10/4/2017  9:40 AM  
Follow Up with Estrella Laguna MD  
[facility] [address] [facility]) Appt Note: Follow up $0CP tdb 10/4/16  
 [address] [facility], 
[address] [place] [address], [address] [phone]  
[address] [facility], [address] [place] [address], [address] [phone] Upcoming Health Maintenance Date Due Pneumococcal 65+ Low/Medium Risk (1 of 2 - PCV13) 12/22/2006 INFLUENZA AGE 9 TO ADULT 8/1/2016 MEDICARE YEARLY EXAM 11/30/2016 GLAUCOMA SCREENING Q2Y 12/1/2016 COLONOSCOPY 9/9/2020 DTaP/Tdap/Td series (2 - Td) 6/14/2026 Allergies as of 1/16/2017  Review Complete On: 1/16/2017 By: Casper Sorto LPN No Known Allergies Current Immunizations  Reviewed on 9/2/2016 Name Date Influenza High Dose Vaccine PF 12/1/2014 Influenza Vaccine 10/24/2013 Tdap 6/14/2016 Not reviewed this visit You Were Diagnosed With   
  
 Codes Comments Poor appetite    -  Primary ICD-10-CM: R63.0 ICD-9-CM: 783.0 Abnormal weight loss     ICD-10-CM: R63.4 ICD-9-CM: 783.21 Right heart failure (HCC)     ICD-10-CM: I50.9 ICD-9-CM: 428.0 Pulmonary hypertension ([facility] [address]     ICD-10-CM: I27.2 ICD-9-CM: 416.8 SOB (shortness of breath)     ICD-10-CM: R06.02 
ICD-9-CM: 786.05 Vitals BP Pulse Height(growth percentile) Weight(growth percentile) SpO2 BMI 126/85 (BP 1 Location: Right arm, BP Patient Position: Sitting) 96 5' 9\" (1.753 m) 169 lb 3.2 oz (76.7 kg) 98% 24.99 kg/m2 Smoking Status Former Smoker Vitals History BMI and BSA Data Body Mass Index Body Surface Area 24.99 kg/m 2 1.93 m 2 Preferred Pharmacy Pharmacy Name Phone Orly Coronel [facility] [address] [facility] [phone] Your Updated Medication List  
  
   
This list is accurate as of: 1/16/17  4:14 PM.  Always use your most recent med list.  
  
  
  
  
 acetaminophen 650 mg CR tablet Commonly known as:  TYLENOL ARTHRITIS PAIN Take 1 Tab by mouth daily. Indications: ARTHRITIC PAIN  
  
 aspirin 81 mg chewable tablet Take 81 mg by mouth daily. furosemide 40 mg tablet Commonly known as:  LASIX Take 40 mg by mouth daily. multivitamin tablet Commonly known as:  ONE A DAY Take 1 Tab by mouth daily. omeprazole 40 mg capsule Commonly known as:  PRILOSEC Take 1 Cap by mouth daily. potassium chloride 10 mEq tablet Commonly known as:  K-DUR, KLOR-CON Take 1 Tab by mouth daily. tamsulosin 0.4 mg capsule Commonly known as:  FLOMAX TAKE ONE CAPSULE BY MOUTH DAILY To-Do List   
 01/20/2017 To Be Determined Appointment with Lindsey Gupta LPN at [place] [address] Patient Instructions -- Cut down your lasix to one 40 mg tablet one every other day 
-- Try ensure; I will check with your pharmacy about ordering this -- Keep your appointment with Dr. Dutch Galdamez at [address] [place] [address] -- ask him also about pulmonary hypertension -- Please make appointment after you see [facility] Heart Failure: Care Instructions Your Care Instructions Heart failure occurs when your heart does not pump as much blood as the body needs.  Failure does not mean that the heart has stopped pumping but rather that it is not pumping as well as it should. Over time, this causes fluid buildup in your lungs and other parts of your body. Fluid buildup can cause shortness of breath, fatigue, swollen ankles, and other problems. By taking medicines regularly, reducing sodium (salt) in your diet, checking your weight every day, and making lifestyle changes, you can feel better and live longer. Follow-up care is a key part of your treatment and safety. Be sure to make and go to all appointments, and call your doctor if you are having problems. It's also a good idea to know your test results and keep a list of the medicines you take. How can you care for yourself at home? Medicines · Be safe with medicines. Take your medicines exactly as prescribed. Call your doctor if you think you are having a problem with your medicine. · Do not take any vitamins, over-the-counter medicine, or herbal products without talking to your doctor first. Cornel Johnson not take ibuprofen (Advil or Motrin) and naproxen (Aleve) without talking to your doctor first. They could make your heart failure worse. · You may be taking some of the following medicine. ¨ Beta-blockers can slow heart rate, decrease blood pressure, and improve your condition. Taking a beta-blocker may lower your chance of needing to be hospitalized. ¨ Angiotensin-converting enzyme inhibitors (ACEIs) reduce the heart's workload, lower blood pressure, and reduce swelling. Taking an ACEI may lower your chance of needing to be hospitalized again. ¨ Angiotensin II receptor blockers (ARBs) work like ACEIs. Your doctor may prescribe them instead of ACEIs. ¨ Diuretics, also called water pills, reduce swelling. ¨ Potassium supplements replace this important mineral, which is sometimes lost with diuretics. ¨ Aspirin and other blood thinners prevent blood clots, which can cause a stroke or heart attack. You will get more details on the specific medicines your doctor prescribes. Diet · Your doctor may suggest that you limit sodium to 2,000 milligrams (mg) a day or less. That is less than 1 teaspoon of salt a day, including all the salt you eat in cooking or in packaged foods. People get most of their sodium from processed foods. Fast food and restaurant meals also tend to be very high in sodium. · Ask your doctor how much liquid you can drink each day. You may have to limit liquids. Weight · Weigh yourself without clothing at the same time each day. Record your weight. Call your doctor if you gain more than 3 pounds in 2 to 3 days. A sudden weight gain may mean that your heart failure is getting worse. Activity level · Start light exercise (if your doctor says it is okay). Even if you can only do a small amount, exercise will help you get stronger, have more energy, and manage your weight and your stress. Walking is an easy way to get exercise. Start out by walking a little more than you did before. Bit by bit, increase the amount you walk. · When you exercise, watch for signs that your heart is working too hard. You are pushing yourself too hard if you cannot talk while you are exercising. If you become short of breath or dizzy or have chest pain, stop, sit down, and rest. 
· If you feel \"wiped out\" the day after you exercise, walk slower or for a shorter distance until you can work up to a better pace. · Get enough rest at night. Sleeping with 1 or 2 pillows under your upper body and head may help you breathe easier. Lifestyle changes · Do not smoke. Smoking can make a heart condition worse. If you need help quitting, talk to your doctor about stop-smoking programs and medicines. These can increase your chances of quitting for good. Quitting smoking may be the most important step you can take to protect your heart. · Limit alcohol to 2 drinks a day for men and 1 drink a day for women. Too much alcohol can cause health problems. · Avoid getting sick from colds and the flu. Get a pneumococcal vaccine shot. If you have had one before, ask your doctor whether you need another dose. Get a flu shot each year. If you must be around people with colds or the flu, wash your hands often. When should you call for help? Call 911 if you have symptoms of sudden heart failure such as: 
· You have severe trouble breathing. · You cough up pink, foamy mucus. · You have a new irregular or rapid heartbeat. Call your doctor now or seek immediate medical care if: 
· You have new or increased shortness of breath. · You are dizzy or lightheaded, or you feel like you may faint. · You have sudden weight gain, such as 3 pounds or more in 2 to 3 days. · You have increased swelling in your legs, ankles, or feet. · You are suddenly so tired or weak that you cannot do your usual activities. Watch closely for changes in your health, and be sure to contact your doctor if: 
· You develop new symptoms. Where can you learn more? Go to http://jose-bernadine.info/. Enter I124 in the search box to learn more about \"Heart Failure: Care Instructions. \" Current as of: January 27, 2016 Content Version: 11.1 © 7734-7786 [employer]. Care instructions adapted under license by [employer] (which disclaims liability or warranty for this information). If you have questions about a medical condition or this instruction, always ask your healthcare professional. [place] [address] any warranty or liability for your use of this information. Introducing [facility] & HEALTH SERVICES! [employer] introduces Defense.Net patient portal. Now you can access parts of your medical record, email your doctor's office, and request medication refills online. 1. In your internet browser, go to [email or web address] "[email or web address]"/[email or web address] 2. Click on the First Time User? Click Here link in the Sign In box.  You will see the New Member Sign Up page. 3. Enter your ChipIn Access Code exactly as it appears below. You will not need to use this code after youve completed the sign-up process. If you do not sign up before the expiration date, you must request a new code. · ChipIn Access Code: [email or web address] Expires: 1/23/2017  9:52 AM 
 
4. Enter the last four digits of your Social Security Number (xxxx) and Date of Birth (mm/dd/yyyy) as indicated and click Submit. You will be taken to the next sign-up page. 5. Create a ChipIn ID. This will be your ChipIn login ID and cannot be changed, so think of one that is secure and easy to remember. 6. Create a ChipIn password. You can change your password at any time. 7. Enter your Password Reset Question and Answer. This can be used at a later time if you forget your password. 8. Enter your e-mail address. You will receive e-mail notification when new information is available in [address] 9. Click Sign Up. You can now view and download portions of your medical record. 10. Click the Download Summary menu link to download a portable copy of your medical information. If you have questions, please visit the Frequently Asked Questions section of the ChipIn website. Remember, ChipIn is NOT to be used for urgent needs. For medical emergencies, dial 911. Now available from your iPhone and Android! Please provide this summary of care documentation to your next provider. Your primary care clinician is listed as CLAIRE Toledo. If you have any questions after today's visit, please call [phone].

## 2017-01-16 NOTE — PATIENT INSTRUCTIONS
-- Cut down your lasix to one 40 mg tablet one every other day  -- Try ensure; I will check with your pharmacy about ordering this  -- Keep your appointment with Dr. Gino Duffy at [facility] -- ask him also about pulmonary hypertension  -- Please make appointment after you see [facility]       Heart Failure: Care Instructions  Your Care Instructions    Heart failure occurs when your heart does not pump as much blood as the body needs. Failure does not mean that the heart has stopped pumping but rather that it is not pumping as well as it should. Over time, this causes fluid buildup in your lungs and other parts of your body. Fluid buildup can cause shortness of breath, fatigue, swollen ankles, and other problems. By taking medicines regularly, reducing sodium (salt) in your diet, checking your weight every day, and making lifestyle changes, you can feel better and live longer. Follow-up care is a key part of your treatment and safety. Be sure to make and go to all appointments, and call your doctor if you are having problems. It's also a good idea to know your test results and keep a list of the medicines you take. How can you care for yourself at home? Medicines  · Be safe with medicines. Take your medicines exactly as prescribed. Call your doctor if you think you are having a problem with your medicine. · Do not take any vitamins, over-the-counter medicine, or herbal products without talking to your doctor first. Leihgann Martínezkennedi not take ibuprofen (Advil or Motrin) and naproxen (Aleve) without talking to your doctor first. They could make your heart failure worse. · You may be taking some of the following medicine. ¨ Beta-blockers can slow heart rate, decrease blood pressure, and improve your condition. Taking a beta-blocker may lower your chance of needing to be hospitalized. ¨ Angiotensin-converting enzyme inhibitors (ACEIs) reduce the heart's workload, lower blood pressure, and reduce swelling.  Taking an ACEI may lower your chance of needing to be hospitalized again. ¨ Angiotensin II receptor blockers (ARBs) work like ACEIs. Your doctor may prescribe them instead of ACEIs. ¨ Diuretics, also called water pills, reduce swelling. ¨ Potassium supplements replace this important mineral, which is sometimes lost with diuretics. ¨ Aspirin and other blood thinners prevent blood clots, which can cause a stroke or heart attack. You will get more details on the specific medicines your doctor prescribes. Diet  · Your doctor may suggest that you limit sodium to 2,000 milligrams (mg) a day or less. That is less than 1 teaspoon of salt a day, including all the salt you eat in cooking or in packaged foods. People get most of their sodium from processed foods. Fast food and restaurant meals also tend to be very high in sodium. · Ask your doctor how much liquid you can drink each day. You may have to limit liquids. Weight  · Weigh yourself without clothing at the same time each day. Record your weight. Call your doctor if you gain more than 3 pounds in 2 to 3 days. A sudden weight gain may mean that your heart failure is getting worse. Activity level  · Start light exercise (if your doctor says it is okay). Even if you can only do a small amount, exercise will help you get stronger, have more energy, and manage your weight and your stress. Walking is an easy way to get exercise. Start out by walking a little more than you did before. Bit by bit, increase the amount you walk. · When you exercise, watch for signs that your heart is working too hard. You are pushing yourself too hard if you cannot talk while you are exercising. If you become short of breath or dizzy or have chest pain, stop, sit down, and rest.  · If you feel \"wiped out\" the day after you exercise, walk slower or for a shorter distance until you can work up to a better pace. · Get enough rest at night.  Sleeping with 1 or 2 pillows under your upper body and head may help you breathe easier. Lifestyle changes  · Do not smoke. Smoking can make a heart condition worse. If you need help quitting, talk to your doctor about stop-smoking programs and medicines. These can increase your chances of quitting for good. Quitting smoking may be the most important step you can take to protect your heart. · Limit alcohol to 2 drinks a day for men and 1 drink a day for women. Too much alcohol can cause health problems. · Avoid getting sick from colds and the flu. Get a pneumococcal vaccine shot. If you have had one before, ask your doctor whether you need another dose. Get a flu shot each year. If you must be around people with colds or the flu, wash your hands often. When should you call for help? Call 911 if you have symptoms of sudden heart failure such as:  · You have severe trouble breathing. · You cough up pink, foamy mucus. · You have a new irregular or rapid heartbeat. Call your doctor now or seek immediate medical care if:  · You have new or increased shortness of breath. · You are dizzy or lightheaded, or you feel like you may faint. · You have sudden weight gain, such as 3 pounds or more in 2 to 3 days. · You have increased swelling in your legs, ankles, or feet. · You are suddenly so tired or weak that you cannot do your usual activities. Watch closely for changes in your health, and be sure to contact your doctor if:  · You develop new symptoms. Where can you learn more? Go to http://jose-bernadine.info/. Enter D916 in the search box to learn more about \"Heart Failure: Care Instructions. \"  Current as of: January 27, 2016  Content Version: 11.1  © 9392-6882 [employer]. Care instructions adapted under license by [employer] (which disclaims liability or warranty for this information).  If you have questions about a medical condition or this instruction, always ask your healthcare professional. Alan Davis disclaims any warranty or liability for your use of this information.

## 2017-01-17 ENCOUNTER — OFFICE VISIT (OUTPATIENT)
Dept: INTERNAL MEDICINE CLINIC | Age: 77
End: 2017-01-17

## 2017-01-17 VITALS
BODY MASS INDEX: 25.03 KG/M2 | SYSTOLIC BLOOD PRESSURE: 102 MMHG | TEMPERATURE: 98.2 F | WEIGHT: 169 LBS | RESPIRATION RATE: 18 BRPM | OXYGEN SATURATION: 95 % | DIASTOLIC BLOOD PRESSURE: 87 MMHG | HEIGHT: 69 IN | HEART RATE: 88 BPM

## 2017-01-17 DIAGNOSIS — Z13.5 GLAUCOMA SCREENING: ICD-10-CM

## 2017-01-17 DIAGNOSIS — Z71.89 ADVANCE CARE PLANNING: ICD-10-CM

## 2017-01-17 DIAGNOSIS — R63.4 UNINTENTIONAL WEIGHT LOSS OF 5% BODY WEIGHT OR LESS WITHIN 1 MONTH: ICD-10-CM

## 2017-01-17 DIAGNOSIS — Z23 ENCOUNTER FOR IMMUNIZATION: ICD-10-CM

## 2017-01-17 DIAGNOSIS — I27.20 PULMONARY HYPERTENSION (HCC): Primary | ICD-10-CM

## 2017-01-17 DIAGNOSIS — I50.23 ACUTE ON CHRONIC SYSTOLIC CONGESTIVE HEART FAILURE (HCC): ICD-10-CM

## 2017-01-17 DIAGNOSIS — Z00.00 MEDICARE ANNUAL WELLNESS VISIT, SUBSEQUENT: ICD-10-CM

## 2017-01-17 DIAGNOSIS — F10.10 ALCOHOL USE DISORDER, MILD, IN CONTROLLED ENVIRONMENT: ICD-10-CM

## 2017-01-17 NOTE — PROGRESS NOTES
1. Have you been to the ER, urgent care clinic since your last visit? Hospitalized since your last visit? Yes When: 1/10/17 [facility] for low BP    2. Have you seen or consulted any other health care providers outside of the [address] [place] [address] since your last visit? Include any pap smears or colon screening. No     Pt is here for   Chief Complaint   Patient presents with    Transitions Of Care     hospital follow up 1/10/17      Pt denies pain at this time. ...

## 2017-01-17 NOTE — PROGRESS NOTES
Vinita Ceja is a 77 y.o. male and presents with Transitions Of Care (hospital follow up 1/10/17 )    Subjective:  Pt is here for hospital follow-up from 1/10 to 1/12 for hypotension. Diagnosed with pulmonary HTN and CHF. Was given IVF and BP meds stopped or adjusted. Instructed to schedule appt with cardio yesterday and PCP today. Will see HF/Pulm HTN clinic on Monday. Reports feeling BETTER THAN when in hospital. Started Ensure since wt loss over hospitalization of 14 lbs, no urinary frequency, diarrhea, or emesis reported. Review of Systems  Constitutional: + wt loss & decreased appetite. negative for fevers, chills  Respiratory:  + MCCALLUM.  negative for cough, hemoptysis,and wheezing  CV:   negative for chest pain, palpitations, and lower extremity edema  GI:   negative for nausea, vomiting, diarrhea, abdominal pain, and melena  Endo:               negative for polyuria,polydipsia,polyphagia, and heat intolerance  Genitourinary: negative for frequency, urgency, dysuria, retention, and hematuria  Integument:  negative for rash, ulcerations, and pruritus  Hematologic:  negative for easy bruising and bleeding  Musculoskel: negative for arthralgias, muscle weakness,and joint pain/swelling  Neurological:  negative for headaches, dizziness, vertigo,and memory/gait problems  Behavl/Psych: negative for feelings of anxiety, depression, suicide, and mood changes    Past Medical History   Diagnosis Date    Arthritis     CHF [facility]congestive heart failure) ([facility] [address]     Dizziness 3/3/2011    GERD (gastroesophageal reflux disease)     Hypercholesterolemia     Hypertension     ICD (implantable cardiac defibrillator), single, in situ 3/3/2011    Rectus diastasis 12/11/2014    Stroke [facility])      Past Surgical History   Procedure Laterality Date    Hx orthopaedic       left knee rep 2005, rt kneerepl 2006    Pr cardiac surg procedure unlist       defibulator 2009    Pr egd balloon dilation esophagus <30 mm diam  10/5/2012  Upper gi endoscopy,biopsy  3/31/2016          Colonoscopy,remv lesn,snare  4/1/2016           Social History     Social History    Marital status:      Spouse name: N/A    Number of children: N/A    Years of education: N/A     Social History Main Topics    Smoking status: Former Smoker    Smokeless tobacco: Never Used    Alcohol use 1.5 oz/week     3 Cans of beer per week    Drug use: Yes     Special: Marijuana      Comment: twice weekly    Sexual activity: Not Asked     Other Topics Concern    None     Social History Narrative    4/2016: lives with son and two grandchildren. Did 1 year of college. Used to work as . Family History   Problem Relation Age of Onset    Heart Disease Mother     Heart Disease Sister      Current Outpatient Prescriptions   Medication Sig Dispense Refill    aspirin delayed-release 81 mg tablet       furosemide (LASIX) 40 mg tablet Take 40 mg by mouth every other day.  tamsulosin (FLOMAX) 0.4 mg capsule TAKE ONE CAPSULE BY MOUTH DAILY 90 Cap 3    omeprazole (PRILOSEC) 40 mg capsule Take 1 Cap by mouth daily. 90 Cap 3    acetaminophen (TYLENOL ARTHRITIS PAIN) 650 mg CR tablet Take 1 Tab by mouth daily. Indications: ARTHRITIC PAIN 30 Tab 11    potassium chloride (K-DUR, KLOR-CON) 10 mEq tablet Take 1 Tab by mouth daily. 90 Tab 3    carvedilol (COREG) 3.125 mg tablet TK 1 T PO  BID  1    losartan (COZAAR) 50 mg tablet TK 1 T PO D  12    diclofenac (VOLTAREN) 1 % gel NAKIA 4 GRAMS EXT AA QID  11    multivitamin (ONE A DAY) tablet Take 1 Tab by mouth daily.        No Known Allergies    Objective:  Visit Vitals    /87 (BP 1 Location: Left arm, BP Patient Position: Sitting)    Pulse 88    Temp 98.2 °F (36.8 °C) (Oral)    Resp 18    Ht 5' 9\" (1.753 m)    Wt 169 lb (76.7 kg)    SpO2 95%    BMI 24.96 kg/m2     Wt Readings from Last 3 Encounters:   01/17/17 169 lb (76.7 kg)   01/16/17 169 lb 3.2 oz (76.7 kg)   01/12/17 169 lb 3.2 oz (76.7 kg)     Physical Exam:   General appearance - alert, well appearing, and in no distress. Mental status - A/O x 4, normal mood and affect. Neck -Supple ,normal CSP. FROM, non-tender. No significant adenopathy/thyromegaly. No JVD. Chest - CTA. Symmetric chest rise. No wheezing, rales or rhonchi. +MCCALLUM following deep breathing for auscultation. Heart - Normal rate, regular rhythm. Normal S1, S2. No MGR or clicks. Abdomen - Soft,non-distended. Normoactive BS in all quadrants. NT, no mass or HSM. Ext- Radial, DP pulses, 2+ bilaterally. No pedal edema, clubbing, or cyanosis. Skin-Warm and dry. No hyperpigmentation, ulcerations, or suspicious lesions. Neuro - Normal speech, no focal findings or movement disorder. Normal strength, gait, and muscle tone. Assessment of cognitive impairment: Alert and oriented x 4    Depression Screen:   PHQ 2 / 9, over the last two weeks 1/17/2017   Little interest or pleasure in doing things Not at all   Feeling down, depressed or hopeless Not at all   Total Score PHQ 2 0       Fall Risk Assessment:    Fall Risk Assessment, last 12 mths 1/17/2017   Able to walk? Yes   Fall in past 12 months? No       Abuse Assessment: Patient NOT domesticly abuse (verbal, physical, and financial)    Current Alcohol use: has 1-3 drinks or beer ONCE weekly   reports that he drinks about 1.5 oz of alcohol per week     Functional Ability:   Does the patient exhibit a steady gait? Yes   How long did it take the patient to get up and walk from a sitting position? 4 seconds   Is the patient self reliant?  (ie can do own laundry, meals, household chores) yes     Does the patient handle his/her own medications? yes     Does the patient handle his/her own money? Yes     Is the patients home safe (ie good lighting, handrails on stairs and bath, etc.)? Yes   Did you notice or did patient express any hearing difficulties? no   Did you notice of did patient express any vision difficulties? No, but needs eye exam. Last done over 2 years ago   Were distance and reading eye charts used? n/a     Advance Care Planning:   Patient was offered the opportunity to discuss advance care planning:  Yes   Does patient have an Advance Directive: No   If no, did you provide information and forms? yes     Health Maintenance   Topic Date Due    GLAUCOMA SCREENING Q2Y  02/16/2017 (Originally 12/1/2016)    Pneumococcal 65+ Low/Medium Risk (1 of 2 - PCV13) 02/16/2017 (Originally 12/22/2006)    MEDICARE YEARLY EXAM  01/18/2018    COLONOSCOPY  09/09/2020    DTaP/Tdap/Td series (2 - Td) 06/14/2026    ZOSTER VACCINE AGE 60>  Addressed    INFLUENZA AGE 9 TO ADULT  Addressed     Assessment/Plan:  Strongly advised to halt ETOH use, tyler. Until f/u appt on Monday. No exercise until f/u at this time, tolerance very limited at this time. Continue Ensure at this time, will await CHF clinic evaluation before further w/u for wt loss as may be due to HF versus malignancy. Cardio changed lasix to every OTHER day use to help. Jovita Mann NN will check with pharmacy to see which PCV given and will record date to update HM. The current medical regimen is effective;  continue present plan and medications. See below for other orders   Follow-up Disposition:  Return in about 4 weeks (around 2/14/2017) for 1 month f/u. ICD-10-CM ICD-9-CM    1. Pulmonary hypertension (HCC) I27.2 416.8    2. Acute on chronic systolic congestive heart failure (HCC) I50.23 428.23      428.0    3. Unintentional weight loss of 5% body weight or less within 1 month R63.4 783.21    4.  Encounter for immunization Z23 V03.89 INFLUENZA VIRUS VACCINE, HIGH DOSE SEASONAL, PRESERVATIVE FREE   5. Glaucoma screening Z13.5 V80.1 REFERRAL TO OPHTHALMOLOGY     Orders Placed This Encounter    INFLUENZA VIRUS VACCINE, HIGH DOSE SEASONAL, PRESERVATIVE FREE    REFERRAL TO OPHTHALMOLOGY     Referral Priority:   Routine     Referral Type:   Consultation     Referral Reason:   Specialty Services Required     Referral Location:   [facility]     Referred to Provider:   MD Quynh Wilson expressed understanding of plan. An After Visit Summary was offered/printed and given to the patient.

## 2017-01-17 NOTE — PATIENT INSTRUCTIONS
Eat at least THREE TIMES a day. You may try ENSURE/BOOST/GLUCERNA, up to three times a day to supplement meals. You may also try the PROTEIN SHAKES with snacks. Need to eat at least 3968-0563 CALORIES a day. Influenza (Flu) Vaccine: Care Instructions  Your Care Instructions  Influenza (flu) is an infection in the lungs and breathing passages. It is caused by the influenza virus. There are different strains, or types, of the flu virus every year. The flu comes on quickly. It can cause a cough, stuffy nose, fever, chills, tiredness, and aches and pains. These symptoms may last up to 10 days. The flu can make you feel very sick, but most of the time it doesn't lead to other problems. But it can cause serious problems in people who are older or who have a long-term illness, such as heart disease or diabetes. You can help prevent the flu by getting a flu vaccine every year, as soon as it is available. You cannot get the flu from the vaccine. The vaccine prevents most cases of the flu. But even when the vaccine doesn't prevent the flu, it can make symptoms less severe and reduce the chance of problems from the flu. Sometimes, young children and people who have an immune system problem may have a slight fever or muscle aches or pains 6 to 12 hours after getting the shot. These symptoms usually last 1 or 2 days. Follow-up care is a key part of your treatment and safety. Be sure to make and go to all appointments, and call your doctor if you are having problems. It's also a good idea to know your test results and keep a list of the medicines you take. Who should get the flu vaccine? Everyone age 7 months or older should get a flu vaccine each year. It lowers the chance of getting and spreading the flu. The vaccine is very important for people who are at high risk for getting other health problems from the flu. This includes:  · Anyone 48years of age or older.   · People who live in a long-term care center, such as a nursing home. · All children 6 months through 25years of age. · Adults and children 6 months and older who have long-term heart or lung problems, such as asthma. · Adults and children 6 months and older who needed medical care or were in a hospital during the past year because of diabetes, chronic kidney disease, or a weak immune system (including HIV or AIDS). · Women who will be pregnant during the flu season. · People who have any condition that can make it hard to breathe or swallow (such as a brain injury or muscle disorders). · People who can give the flu to others who are at high risk for problems from the flu. This includes all health care workers and close contacts of people age 72 or older. Who should not get the flu vaccine? The person who gives the vaccine may tell you not to get it if you:  · Have a severe allergy to eggs or any part of the vaccine. · Have had a severe reaction to a flu vaccine in the past.  · Have had Guillain-Barré syndrome (GBS). · Are sick with a fever. (Get the vaccine when symptoms are gone.)  How can you care for yourself at home? · If you or your child has a sore arm or a slight fever after the shot, take an over-the-counter pain medicine, such as acetaminophen (Tylenol) or ibuprofen (Advil, Motrin). Read and follow all instructions on the label. Do not give aspirin to anyone younger than 20. It has been linked to Reye syndrome, a serious illness. · Do not take two or more pain medicines at the same time unless the doctor told you to. Many pain medicines have acetaminophen, which is Tylenol. Too much acetaminophen (Tylenol) can be harmful. When should you call for help? Call 911 anytime you think you may need emergency care. For example, call if after getting the flu vaccine:  · You have symptoms of a severe reaction to the flu vaccine. Symptoms of a severe reaction may include:  ¨ Severe difficulty breathing.   ¨ Sudden raised, red areas (hives) all over your body.  ¨ Severe lightheadedness. Call your doctor now or seek immediate medical care if after getting the flu vaccine:  · You think you are having a reaction to the flu vaccine, such as a new fever. Watch closely for changes in your health, and be sure to contact your doctor if you have any problems. Where can you learn more? Go to http://jose-bernadine.info/. Enter Q267 in the search box to learn more about \"Influenza (Flu) Vaccine: Care Instructions. \"  Current as of: August 1, 2016  Content Version: 11.1  © 8888-7074 [employer]. Care instructions adapted under license by [employer] (which disclaims liability or warranty for this information). If you have questions about a medical condition or this instruction, always ask your healthcare professional. [address] any warranty or liability for your use of this information.

## 2017-01-17 NOTE — MR AVS SNAPSHOT
Visit Information Date & Time Provider Department Dept. Phone Encounter #  
 1/17/2017  2:20 PM Venkat Vitale NP [address] [facility] [phone] [id number] Follow-up Instructions Return in about 4 weeks (around 2/14/2017) for 1 month f/u. Your Appointments 10/4/2017  9:40 AM  
Follow Up with Angelique Mcghee MD  
[facility] at [facility] Appt Note: Follow up $0CP tdb 10/4/16  
 [address], 
[address] [place] [address], [address] [phone]  
[address] , [address] [facility], [address] [address] Upcoming Health Maintenance Date Due INFLUENZA AGE 9 TO ADULT 8/1/2016 GLAUCOMA SCREENING Q2Y 2/16/2017* Pneumococcal 65+ Low/Medium Risk (1 of 2 - PCV13) 2/16/2017* MEDICARE YEARLY EXAM 1/18/2018 COLONOSCOPY 9/9/2020 DTaP/Tdap/Td series (2 - Td) 6/14/2026 *Topic was postponed. The date shown is not the original due date. Allergies as of 1/17/2017  Review Complete On: 1/17/2017 By: Venkat Vitale NP No Known Allergies Current Immunizations  Reviewed on 9/2/2016 Name Date Influenza High Dose Vaccine PF  Incomplete, 12/1/2014 Influenza Vaccine 10/24/2013 Tdap 6/14/2016 Not reviewed this visit You Were Diagnosed With   
  
 Codes Comments Pulmonary hypertension ([facility] [address]    -  Primary ICD-10-CM: I27.2 ICD-9-CM: 416.8 Acute on chronic systolic congestive heart failure (HCC)     ICD-10-CM: K37.79 ICD-9-CM: 428.23, 428.0 Unintentional weight loss of 5% body weight or less within 1 month     ICD-10-CM: R63.4 ICD-9-CM: 783.21 Encounter for immunization     ICD-10-CM: S04 ICD-9-CM: V03.89 Glaucoma screening     ICD-10-CM: Z13.5 ICD-9-CM: V80.1 Vitals BP Pulse Temp Resp Height(growth percentile) Weight(growth percentile)  102/87 (BP 1 Location: Left arm, BP Patient Position: Sitting) 88 98.2 °F (36.8 °C) (Oral) 18 5' 9\" (1.753 m) 169 lb (76.7 kg) SpO2 BMI Smoking Status 95% 24.96 kg/m2 Former Smoker Vitals History BMI and BSA Data Body Mass Index Body Surface Area 24.96 kg/m 2 1.93 m 2 Preferred Pharmacy Pharmacy Name Phone [facility], [address] [phone] Your Updated Medication List  
  
   
This list is accurate as of: 1/17/17  4:43 PM.  Always use your most recent med list.  
  
  
  
  
 acetaminophen 650 mg CR tablet Commonly known as:  TYLENOL ARTHRITIS PAIN Take 1 Tab by mouth daily. Indications: ARTHRITIC PAIN  
  
 aspirin delayed-release 81 mg tablet  
  
 carvedilol 3.125 mg tablet Commonly known as:  COREG  
TK 1 T PO  BID  
  
 diclofenac 1 % Gel Commonly known as:  VOLTAREN  
NAKIA 4 GRAMS EXT AA QID  
  
 furosemide 40 mg tablet Commonly known as:  LASIX Take 40 mg by mouth every other day. losartan 50 mg tablet Commonly known as:  COZAAR TK 1 T PO D  
  
 multivitamin tablet Commonly known as:  ONE A DAY Take 1 Tab by mouth daily. omeprazole 40 mg capsule Commonly known as:  PRILOSEC Take 1 Cap by mouth daily. potassium chloride 10 mEq tablet Commonly known as:  K-DUR, KLOR-CON Take 1 Tab by mouth daily. tamsulosin 0.4 mg capsule Commonly known as:  FLOMAX TAKE ONE CAPSULE BY MOUTH DAILY We Performed the Following INFLUENZA VIRUS VACCINE, HIGH DOSE SEASONAL, PRESERVATIVE FREE [82075 CPT(R)] REFERRAL TO OPHTHALMOLOGY [REF57 Custom] Comments: BLAKE/glaucoma screening Follow-up Instructions Return in about 4 weeks (around 2/14/2017) for 1 month f/u. To-Do List   
 01/20/2017 To Be Determined Appointment with Keith Petersen LPN at [facility] 39 Referral Information Referral ID Referred By Referred To [id number] [place] , [address] [facility] [address] [place], [address] Visits Status Start Date End Date 1 New Request 1/17/17 1/17/18 If your referral has a status of pending review or denied, additional information will be sent to support the outcome of this decision. Patient Instructions Eat at least THREE TIMES a day. You may try ENSURE/BOOST/GLUCERNA, up to three times a day to supplement meals. You may also try the PROTEIN SHAKES with snacks. Need to eat at least 8832-4183 CALORIES a day. Influenza (Flu) Vaccine: Care Instructions Your Care Instructions Influenza (flu) is an infection in the lungs and breathing passages. It is caused by the influenza virus. There are different strains, or types, of the flu virus every year. The flu comes on quickly. It can cause a cough, stuffy nose, fever, chills, tiredness, and aches and pains. These symptoms may last up to 10 days. The flu can make you feel very sick, but most of the time it doesn't lead to other problems. But it can cause serious problems in people who are older or who have a long-term illness, such as heart disease or diabetes. You can help prevent the flu by getting a flu vaccine every year, as soon as it is available. You cannot get the flu from the vaccine. The vaccine prevents most cases of the flu. But even when the vaccine doesn't prevent the flu, it can make symptoms less severe and reduce the chance of problems from the flu. Sometimes, young children and people who have an immune system problem may have a slight fever or muscle aches or pains 6 to 12 hours after getting the shot. These symptoms usually last 1 or 2 days. Follow-up care is a key part of your treatment and safety. Be sure to make and go to all appointments, and call your doctor if you are having problems. It's also a good idea to know your test results and keep a list of the medicines you take. Who should get the flu vaccine? Everyone age 7 months or older should get a flu vaccine each year. It lowers the chance of getting and spreading the flu. The vaccine is very important for people who are at high risk for getting other health problems from the flu. This includes: · Anyone 48years of age or older. · People who live in a long-term care center, such as a nursing home. · All children 6 months through 25years of age. · Adults and children 6 months and older who have long-term heart or lung problems, such as asthma. · Adults and children 6 months and older who needed medical care or were in a hospital during the past year because of diabetes, chronic kidney disease, or a weak immune system (including HIV or AIDS). · Women who will be pregnant during the flu season. · People who have any condition that can make it hard to breathe or swallow (such as a brain injury or muscle disorders). · People who can give the flu to others who are at high risk for problems from the flu. This includes all health care workers and close contacts of people age 72 or older. Who should not get the flu vaccine? The person who gives the vaccine may tell you not to get it if you: 
· Have a severe allergy to eggs or any part of the vaccine. · Have had a severe reaction to a flu vaccine in the past. 
· Have had Guillain-Barré syndrome (GBS). · Are sick with a fever. (Get the vaccine when symptoms are gone.) How can you care for yourself at home? · If you or your child has a sore arm or a slight fever after the shot, take an over-the-counter pain medicine, such as acetaminophen (Tylenol) or ibuprofen (Advil, Motrin). Read and follow all instructions on the label. Do not give aspirin to anyone younger than 20. It has been linked to Reye syndrome, a serious illness. · Do not take two or more pain medicines at the same time unless the doctor told you to.  Many pain medicines have acetaminophen, which is Tylenol. Too much acetaminophen (Tylenol) can be harmful. When should you call for help? Call 911 anytime you think you may need emergency care. For example, call if after getting the flu vaccine: 
· You have symptoms of a severe reaction to the flu vaccine. Symptoms of a severe reaction may include: ¨ Severe difficulty breathing. ¨ Sudden raised, red areas (hives) all over your body. ¨ Severe lightheadedness. Call your doctor now or seek immediate medical care if after getting the flu vaccine: 
· You think you are having a reaction to the flu vaccine, such as a new fever. Watch closely for changes in your health, and be sure to contact your doctor if you have any problems. Where can you learn more? Go to http://jose-bernadine.info/. Enter C980 in the search box to learn more about \"Influenza (Flu) Vaccine: Care Instructions. \" Current as of: August 1, 2016 Content Version: 11.1 © 2463-0812 [employer]. Care instructions adapted under license by [employer] (which disclaims liability or warranty for this information). If you have questions about a medical condition or this instruction, always ask your healthcare professional. [place] [address] any warranty or liability for your use of this information. Introducing [facility]! Romayne Duster introduces [employer] patient portal. Now you can access parts of your medical record, email your doctor's office, and request medication refills online. 1. In your internet browser, go to [email or web address] 2. Click on the First Time User? Click Here link in the Sign In box. You will see the New Member Sign Up page. 3. Enter your [employer] Access Code exactly as it appears below. You will not need to use this code after youve completed the sign-up process. If you do not sign up before the expiration date, you must request a new code.  
 
· [employer] Access Code: [email or web address] 
 Expires: 1/23/2017  9:52 AM 
 
4. Enter the last four digits of your Social Security Number (xxxx) and Date of Birth (mm/dd/yyyy) as indicated and click Submit. You will be taken to the next sign-up page. 5. Create a [employer] ID. This will be your [employer] login ID and cannot be changed, so think of one that is secure and easy to remember. 6. Create a [employer] password. You can change your password at any time. 7. Enter your Password Reset Question and Answer. This can be used at a later time if you forget your password. 8. Enter your e-mail address. You will receive e-mail notification when new information is available in [address] 9. Click Sign Up. You can now view and download portions of your medical record. 10. Click the Download Summary menu link to download a portable copy of your medical information. If you have questions, please visit the Frequently Asked Questions section of the [employer] website. Remember, [employer] is NOT to be used for urgent needs. For medical emergencies, dial 911. Now available from your iPhone and Android! Please provide this summary of care documentation to your next provider. Your primary care clinician is listed as CLAIRE Osborne. If you have any questions after today's visit, please call [phone].

## 2017-01-19 ENCOUNTER — PATIENT OUTREACH (OUTPATIENT)
Dept: INTERNAL MEDICINE CLINIC | Age: 77
End: 2017-01-19

## 2017-01-19 NOTE — PROGRESS NOTES
During pt's ov on 1/17 this writer met with him to complete post hospital assessment. Pt's name, addr and phone have been verified as pt identifiers. Mr. Kang Pratt tells this writer that he is doing well. He had a f/u with Dr. Rosen, Cardiology, who has decreased his Lasix to one every other day. He has also suggested the pt try ensure meal supplements. Pt denies any falls, pain, dizziness/lightheadedness, a need for cane/walker since being discharged. He does have a home BP cuff but tends to check when he \"can't do nothing\". He is d/t meet with Dr. Sadi Molina @ [facility] r/t his Pulmonary Hypertension. While going over Health Maintenance he mentions that he had a flu and pneumonia at the [facility] [place] location. He also has had is eye exam by a physician on the [facility] campus. This writer will continue to follow pt's status.

## 2017-01-20 ENCOUNTER — HOME CARE VISIT (OUTPATIENT)
Dept: HOME HEALTH SERVICES | Facility: HOME HEALTH | Age: 77
End: 2017-01-20

## 2017-02-10 ENCOUNTER — OFFICE VISIT (OUTPATIENT)
Dept: CARDIOLOGY CLINIC | Age: 77
End: 2017-02-10

## 2017-02-10 VITALS
HEART RATE: 109 BPM | SYSTOLIC BLOOD PRESSURE: 93 MMHG | WEIGHT: 167.2 LBS | HEIGHT: 69 IN | OXYGEN SATURATION: 96 % | BODY MASS INDEX: 24.76 KG/M2 | DIASTOLIC BLOOD PRESSURE: 63 MMHG

## 2017-02-10 DIAGNOSIS — I10 ESSENTIAL HYPERTENSION: ICD-10-CM

## 2017-02-10 DIAGNOSIS — I50.30: Primary | ICD-10-CM

## 2017-02-10 RX ORDER — FUROSEMIDE 20 MG/1
TABLET ORAL
Refills: 3 | COMMUNITY
Start: 2017-01-30 | End: 2017-02-10 | Stop reason: DRUGHIGH

## 2017-02-10 RX ORDER — PANTOPRAZOLE SODIUM 40 MG/1
TABLET, DELAYED RELEASE ORAL
Refills: 3 | COMMUNITY
Start: 2017-01-30

## 2017-02-10 RX ORDER — ATORVASTATIN CALCIUM 40 MG/1
TABLET, FILM COATED ORAL
Refills: 3 | COMMUNITY
Start: 2017-01-30

## 2017-02-10 NOTE — MR AVS SNAPSHOT
Visit Information Date & Time Provider Department Dept. Phone Encounter #  
 2/10/2017 10:30 AM Brett Garrett MD [address] [facility] at [facility] - PSYCHIATRIC SUPPORT [facility]  Your Appointments 2/14/2017  2:20 PM  
ROUTINE CARE with José Miguel Restrepo NP  
[address] [facility]) Appt Note: follow up [address] [facility] [address]  
[phone]  
  
   
 [address] [place]  
  
    
 10/4/2017  9:40 AM  
Follow Up with Ian Beck MD  
[facility] at [facility]) Appt Note: Follow up $0CP tdb 10/4/16  
 [address] [place] [address], 
[address] [place] [address], [address] [phone]  
[address] [facility], [address] [place] [address], [address] [address] Upcoming Health Maintenance Date Due  
 GLAUCOMA SCREENING Q2Y 2/16/2017* Pneumococcal 65+ Low/Medium Risk (1 of 2 - PCV13) 2/16/2017* MEDICARE YEARLY EXAM 1/18/2018 COLONOSCOPY 9/9/2020 DTaP/Tdap/Td series (2 - Td) 6/14/2026 *Topic was postponed. The date shown is not the original due date. Allergies as of 2/10/2017  Review Complete On: 1/17/2017 By: José Miguel Restrepo NP No Known Allergies Current Immunizations  Reviewed on 1/17/2017 Name Date Influenza High Dose Vaccine PF 1/17/2017, 12/1/2014 Influenza Vaccine 10/24/2013 Tdap 6/14/2016 Not reviewed this visit You Were Diagnosed With   
  
 Codes Comments NYHA class 4 heart failure with borderline preserved ejection fraction ([facility] [address]    -  Primary ICD-10-CM: I50.9 ICD-9-CM: 428.9 Essential hypertension     ICD-10-CM: I10 
ICD-9-CM: 401.9 Vitals BP Pulse Height(growth percentile) Weight(growth percentile) SpO2 BMI  
 93/63 (!) 109 5' 9\" (1.753 m) 167 lb 3.2 oz (75.8 kg) 96% 24.69 kg/m2 Smoking Status Former Smoker BMI and BSA Data Body Mass Index Body Surface Area  
 24.69 kg/m 2 1.92 m 2 Preferred Pharmacy Pharmacy Name Phone [address] [facility] [address], [address] [facility] [phone] Your Updated Medication List  
  
   
This list is accurate as of: 2/10/17  4:43 PM.  Always use your most recent med list.  
  
  
  
  
 acetaminophen 650 mg CR tablet Commonly known as:  TYLENOL ARTHRITIS PAIN Take 1 Tab by mouth daily. Indications: ARTHRITIC PAIN  
  
 aspirin delayed-release 81 mg tablet  
  
 atorvastatin 40 mg tablet Commonly known as:  LIPITOR TK 1 T PO HS  
  
 carvedilol 3.125 mg tablet Commonly known as:  COREG  
TK 1 T PO  BID  
  
 diclofenac 1 % Gel Commonly known as:  VOLTAREN  
NAKIA 4 GRAMS EXT AA QID  
  
 furosemide 40 mg tablet Commonly known as:  LASIX Take 40 mg by mouth every other day. losartan 50 mg tablet Commonly known as:  COZAAR TK 1 T PO D  
  
 multivitamin tablet Commonly known as:  ONE A DAY Take 1 Tab by mouth daily. pantoprazole 40 mg tablet Commonly known as:  PROTONIX TK 1 T PO BEFORE BREAKFAST potassium chloride 10 mEq tablet Commonly known as:  K-DUR, KLOR-CON Take 1 Tab by mouth daily. tamsulosin 0.4 mg capsule Commonly known as:  FLOMAX TAKE ONE CAPSULE BY MOUTH DAILY Patient Instructions All of your key treatment decisions should come from [facility] but I wish to stay involved with your care as much as possible Introducing \[facility]\"" & [facility] SERVICES! [employer] introduces [employer] patient portal. Now you can access parts of your medical record, email your doctor's office, and request medication refills online. 1. In your internet browser, go to [email or web address] 2. Click on the First Time User? Click Here link in the Sign In box. You will see the New Member Sign Up page. 3. Enter your [employer] Access Code exactly as it appears below. You will not need to use this code after youve completed the sign-up process. If you do not sign up before the expiration date, you must request a new code. · [employer] Access Code: [email or web address] Expires: 5/11/2017  4:42 PM 
 
4. Enter the last four digits of your Social Security Number (xxxx) and Date of Birth (mm/dd/yyyy) as indicated and click Submit. You will be taken to the next sign-up page. 5. Create a [employer] ID. This will be your [employer] login ID and cannot be changed, so think of one that is secure and easy to remember. 6. Create a [employer] password. You can change your password at any time. 7. Enter your Password Reset Question and Answer. This can be used at a later time if you forget your password. 8. Enter your e-mail address. You will receive e-mail notification when new information is available in [address] 9. Click Sign Up. You can now view and download portions of your medical record. 10. Click the Download Summary menu link to download a portable copy of your medical information. If you have questions, please visit the Frequently Asked Questions section of the [employer] website. Remember, [employer] is NOT to be used for urgent needs. For medical emergencies, dial 911. Now available from your iPhone and Android! Please provide this summary of care documentation to your next provider. Your primary care clinician is listed as CLAIRE Osborne. If you have any questions after today's visit, please call [phone].

## 2017-02-10 NOTE — PROGRESS NOTES
[facility]   [address] [facility], [address] [place] [address]                                          NEW PATIENT HPI/FOLLOW-UP      NAME:  Reddy Vela   :   1940   MRN:   [id number]   PCP:  Fili Block NP           Subjective: The patient is a 77y.o. year old male  who returns for a routine follow-up. Since the last visit, patient reports hooking in with care at [facility]. He is currently on parenteral milrinone via PICC line. He notes some difficulty adjusting to the regimen and the lifestyle changes associated with it, especially around carrying \"a purse\" and maintains a healthy sense of humor about his status. He has discussed placement of LVAD with [facility] team, but notes he is not ready to pursue this therapy yet. In general, he feels much better and is experiencing less symptoms. He notes a fullness in right abdomen, and occasional SOB and states he felt particularly ill yesterday. He notified his doctor who doubled his Lasix and he is ambulating better without significant dyspnea today. Reports \"some congestion\" in his lungs. Denies chest pain, edema, medication intolerance, palpitations, sputum, syncope, dizziness or light headedness. Doing satisfactorily. Review of Systems  General: Pt denies excessive weight gain or loss. Pt is able to conduct ADL's. Respiratory: +shortness of breath, MCCALLUM, Denies wheezing or stridor.   Cardiovascular: Denies precordial pain, palpitations, edema or PND  Gastrointestinal: Denies poor appetite, indigestion, abdominal pain or blood in stool  Peripheral vascular: Denies claudication, leg cramps  Neuropsychiatric: Denies paresthesias,tingling,numbness,anxiety,depression,fatigue  Musculoskeletal: Denies pain,tenderness, soreness,swelling      Past Medical History   Diagnosis Date    Arthritis     [facility]congest[facility] heart failure[facility])     Dizziness 3/3/2011    GERD (gastroesophageal reflux disease)     Hypercholesterolemia     Hypertension     ICD (implantable cardiac defibrillator), single, in situ 3/3/2011    Rectus diastasis 12/11/2014    Stroke [facility])      Patient Active Problem List    Diagnosis Date Noted    NYHA class 4 heart failure with borderline preserved ejection fraction ([facility] [address] 02/10/2017    Pulmonary hypertension ([facility] [address] 01/12/2017    Stenosis of both internal carotid arteries 10/04/2016    Cerebrovascular accident (CVA) due to occlusion of right middle cerebral artery ([facility] [address] 09/02/2016    Upper GI bleed 03/30/2016    Rectus diastasis 12/11/2014    Anxiety 10/24/2013    Anemia 03/03/2011    Chest discomfort 03/03/2011    Pacemaker 03/03/2011    Dizziness 03/03/2011    Bronchospasm 11/16/2010    Acute on chronic systolic congestive heart failure ([facility] [address] 08/09/2010    HTN (hypertension) 08/09/2010    SOB (shortness of breath) 08/09/2010    PPD positive 08/09/2010      Past Surgical History   Procedure Laterality Date    Hx orthopaedic       left knee rep 2005, rt kneerepl 2006    Pr cardiac surg procedure unlist       defibulator 2009    Pr egd balloon dilation esophagus <30 mm diam  10/5/2012          Upper gi endoscopy,biopsy  3/31/2016          Colonoscopy,remv lesn,snare  4/1/2016           No Known Allergies   Family History   Problem Relation Age of Onset    Heart Disease Mother     Heart Disease Sister       Social History     Social History    Marital status:      Spouse name: N/A    Number of children: N/A    Years of education: N/A     Occupational History    Not on file. Social History Main Topics    Smoking status: Former Smoker    Smokeless tobacco: Never Used    Alcohol use 1.5 oz/week     3 Cans of beer per week    Drug use: Yes     Special: Marijuana      Comment: twice weekly    Sexual activity: Not on file     Other Topics Concern    Not on file     Social History Narrative    4/2016: lives with son and two grandchildren.   Did 1 year of [employer]. Used to work as . Current Outpatient Prescriptions   Medication Sig    diclofenac (VOLTAREN) 1 % gel NAKIA 4 GRAMS EXT AA QID    aspirin delayed-release 81 mg tablet     furosemide (LASIX) 40 mg tablet Take 40 mg by mouth every other day.  multivitamin (ONE A DAY) tablet Take 1 Tab by mouth daily.  tamsulosin (FLOMAX) 0.4 mg capsule TAKE ONE CAPSULE BY MOUTH DAILY    omeprazole (PRILOSEC) 40 mg capsule Take 1 Cap by mouth daily.  acetaminophen (TYLENOL ARTHRITIS PAIN) 650 mg CR tablet Take 1 Tab by mouth daily. Indications: ARTHRITIC PAIN    potassium chloride (K-DUR, KLOR-CON) 10 mEq tablet Take 1 Tab by mouth daily.  carvedilol (COREG) 3.125 mg tablet TK 1 T PO  BID    losartan (COZAAR) 50 mg tablet TK 1 T PO D     No current facility-administered medications for this visit. I have reviewed the MAs notes, vitals, problem list, allergy list, medical history, family medical, social history and medications. Objective:     Physical Exam:     Vitals:    02/10/17 1110   BP: 93/63   Pulse: (!) 109   SpO2: 96%   Weight: 167 lb 3.2 oz (75.8 kg)   Height: 5' 9\" (1.753 m)    Body mass index is 24.69 kg/(m^2). General: Well developed, in no acute distress. HEENT: No carotid bruits, no JVD, trach is midline. Heart:  Normal S1/S2 negative S3 or S4. Regular, no murmur, gallop or rub.   Respiratory: Clear bilaterally, scant expiratory wheeze RLL  Abdomen:   Soft, non-tender, bowel sounds are active.   Extremities:  No edema, normal cap refill, no cyanosis. PICC line in place RUE  Neuro: A&Ox3, speech clear, gait stable. Skin: Skin color is normal. No rashes or lesions. No diaphoresis.   Vascular: 2+ pulses symmetric in all extremities        Data Review:       Cardiology Labs:    Results for orders placed or performed during the hospital encounter of 01/10/17   EKG, 12 LEAD, INITIAL   Result Value Ref Range    Ventricular Rate 87 BPM    Atrial Rate 87 BPM    P-R Interval 142 ms    QRS Duration 108 ms    Q-T Interval 404 ms    QTC Calculation (Bezet) 486 ms    Calculated P Axis 50 degrees    Calculated R Axis -42 degrees    Calculated T Axis 103 degrees    Diagnosis       Sinus rhythm with occasional premature ventricular complexes  Possible Left atrial enlargement  Left axis deviation  Incomplete left bundle branch block  T wave abnormality, consider lateral ischemia  Prolonged QT  When compared with ECG of 03-SEP-2016 12:22,  T wave inversion now evident in Lateral leads  Confirmed by Tejas Fierro ([id number]) on 1/10/2017 8:19:35 PM         Lab Results   Component Value Date/Time    Cholesterol, total 126 09/03/2016 05:13 AM    HDL Cholesterol 44 09/03/2016 05:13 AM    LDL, calculated 68 09/03/2016 05:13 AM    Triglyceride 70 09/03/2016 05:13 AM    CHOL/HDL Ratio 2.9 09/03/2016 05:13 AM       Lab Results   Component Value Date/Time    Sodium 140 01/12/2017 04:46 AM    Potassium 4.7 01/12/2017 04:46 AM    Chloride 106 01/12/2017 04:46 AM    CO2 26 01/12/2017 04:46 AM    Anion gap 8 01/12/2017 04:46 AM    Glucose 110 01/12/2017 04:46 AM    BUN 21 01/12/2017 04:46 AM    Creatinine 1.16 01/12/2017 04:46 AM    BUN/Creatinine ratio 18 01/12/2017 04:46 AM    GFR est AA >60 01/12/2017 04:46 AM    GFR est non-AA >60 01/12/2017 04:46 AM    Calcium 8.4 01/12/2017 04:46 AM    Bilirubin, total 0.6 01/10/2017 03:19 PM    AST (SGOT) 20 01/10/2017 03:19 PM    Alk. phosphatase 68 01/10/2017 03:19 PM    Protein, total 7.2 01/10/2017 03:19 PM    Albumin 3.5 01/10/2017 03:19 PM    Globulin 3.7 01/10/2017 03:19 PM    A-G Ratio 0.9 01/10/2017 03:19 PM    ALT (SGPT) 25 01/10/2017 03:19 PM          Assessment:       ICD-10-CM ICD-9-CM    1. NYHA class 4 heart failure with borderline preserved ejection fraction (HCC) I50.9 428.9    2. Essential hypertension I10 401.9          Discussion: Patient presents at this time stable from a cardiac perspective with improvement in weight.  Is under care with [facility] advanced [facility] failure team. Pleased with present status. Plan: 1. Continue [facility] follow up with Heart Failure    2. Follow up: as needed; we will see you anytime    I have discussed the diagnosis with the patient and the intended plan as seen in the above orders. The patient has received an after-visit summary and questions were answered concerning future plans. I have discussed any concerning medication side effects and warnings with the patient as well.     Lisy Leal PA-C  2/10/2017

## 2017-02-10 NOTE — PROGRESS NOTES
In addition to the listed medications, the patient is also receiving a continuous milrinone drip through a PICC line. He recently had a surprising fluid buildup and he was treated with supplemental doses of furosemide with good result. At present he is free of edema and he is not dyspneic with slow ambulation. He is being strongly considered for possible placement of LVAD but he is so far reluctant to do so. At the age of 77 he is not a transplant candidate in this program, although I did explain to him that some centers are intentionally treating with elderly donor hearts under the circumstances with reasonably good results. I also explained in a long teaching session that the LVAD can be valid destination therapy and that people learn to live with it very well, caring independently for matters such as battery charge and hygiene. He has strong family support. His younger sister came with him for the visit. He is clearly a good candidate in every respect for advanced heart failure therapy. I explained to Mr. Farrah Pena that under the circumstances, all therapeutic decisions and changes need to come from the [facility] advanced heart failure service. However, I also made it clear that we are all very interested in his progress and wish to keep track of him to provide for any other need he might have. On examination today cardiac rhythm is regular with 1-2 PVCs per minute. Lungs are relatively clear with sparse fine rales at the bases only. Jugular veins are flat in a seated position.     I verified all findings and discussed the entire rationale of care with DANYA Bowie

## 2017-02-10 NOTE — Clinical Note
He is on a chronic milrinone drip at this time and he is far more comfortable. He is a candidate for LVAD as destination therapy but he is not a transplant candidate based on age. I answered his many of his questions today as I could.

## 2017-02-10 NOTE — PATIENT INSTRUCTIONS
All of your key treatment decisions should come from [facility] but I wish to stay involved with your care as much as possible

## 2017-02-13 ENCOUNTER — TELEPHONE (OUTPATIENT)
Dept: INTERNAL MEDICINE CLINIC | Age: 77
End: 2017-02-13

## 2017-02-13 DIAGNOSIS — Z87.39 H/O: GOUT: Primary | ICD-10-CM

## 2017-02-13 RX ORDER — ALLOPURINOL 100 MG/1
100 TABLET ORAL DAILY
Qty: 90 TAB | Refills: 1 | Status: SHIPPED | OUTPATIENT
Start: 2017-02-13 | End: 2017-02-14

## 2017-02-13 NOTE — TELEPHONE ENCOUNTER
[facility] pharmacy is requesting rx for allopurinal 100 mg tablets #90 .  [facility] # [phone]

## 2017-02-14 ENCOUNTER — OFFICE VISIT (OUTPATIENT)
Dept: INTERNAL MEDICINE CLINIC | Age: 77
End: 2017-02-14

## 2017-02-14 VITALS
TEMPERATURE: 96.8 F | HEIGHT: 69 IN | WEIGHT: 166 LBS | RESPIRATION RATE: 18 BRPM | HEART RATE: 88 BPM | SYSTOLIC BLOOD PRESSURE: 103 MMHG | OXYGEN SATURATION: 99 % | DIASTOLIC BLOOD PRESSURE: 58 MMHG | BODY MASS INDEX: 24.59 KG/M2

## 2017-02-14 DIAGNOSIS — I50.23 ACUTE ON CHRONIC SYSTOLIC CONGESTIVE HEART FAILURE (HCC): ICD-10-CM

## 2017-02-14 DIAGNOSIS — I50.30: Primary | ICD-10-CM

## 2017-02-14 DIAGNOSIS — Z95.0 PACEMAKER: ICD-10-CM

## 2017-02-14 DIAGNOSIS — R06.02 SOB (SHORTNESS OF BREATH): ICD-10-CM

## 2017-02-14 DIAGNOSIS — I27.20 PULMONARY HYPERTENSION (HCC): ICD-10-CM

## 2017-02-14 RX ORDER — POTASSIUM CHLORIDE 750 MG/1
TABLET, FILM COATED, EXTENDED RELEASE ORAL
COMMUNITY
Start: 2017-02-10

## 2017-02-14 RX ORDER — LANOLIN ALCOHOL/MO/W.PET/CERES
CREAM (GRAM) TOPICAL
COMMUNITY
Start: 2017-02-10

## 2017-02-14 NOTE — PROGRESS NOTES
Melonie Costa is a 77 y.o. male and presents with Hypertension (follow up)    Subjective:  Pt is here for hospital follow-up from 1/18 to 1/29 for hypotension. Diagnosed with pulmonary HTN and acute on chronic HF. Was given diuretics and milrinone drip. Instructed to schedule appt with cardio, pulm, and PCP. Will see HF/Pulm HTN clinic on Monday. Reports feeling BETTER THAN when in hospital, less SOB. Advised may need an LVAD if these treatments are unsuccessful. Review of Systems  Constitutional: ~ 4lb wt loss & decreased appetite. negative for fevers, chills  Respiratory:  + MCCALLUM.  negative for cough, hemoptysis,and wheezing  CV:   negative for chest pain, palpitations, and lower extremity edema  GI:   negative for nausea, vomiting, diarrhea, abdominal pain, and melena  Endo:               negative for polyuria,polydipsia,polyphagia, and heat intolerance  Genitourinary: negative for frequency, urgency, dysuria, retention, and hematuria  Integument:  negative for rash, ulcerations, and pruritus  Hematologic:  negative for easy bruising and bleeding  Musculoskel: negative for arthralgias, muscle weakness,and joint pain/swelling  Neurological:  negative for headaches, dizziness, vertigo,and memory/gait problems  Behavl/Psych: negative for feelings of anxiety, depression, suicide, and mood changes    Past Medical History   Diagnosis Date    Arthritis     CHF (congestive heart failure) ([facility] [address]     Dizziness 3/3/2011    GERD (gastroesophageal reflux disease)     Hypercholesterolemia     Hypertension     ICD (implantable cardiac defibrillator), single, in situ 3/3/2011    Rectus diastasis 12/11/2014    Stroke [facility])      Past Surgical History   Procedure Laterality Date    Hx orthopaedic       left knee rep 2005, rt kneerepl 2006    Pr cardiac surg procedure unlist       defibulator 2009    Pr egd balloon dilation esophagus <30 mm diam  10/5/2012          Upper gi endoscopy,biopsy  3/31/2016          Colonoscopy,lyndsey castrejon,snare  4/1/2016           Social History     Social History    Marital status:      Spouse name: N/A    Number of children: N/A    Years of education: N/A     Social History Main Topics    Smoking status: Former Smoker    Smokeless tobacco: Never Used    Alcohol use 1.5 oz/week     3 Cans of beer per week    Drug use: Yes     Special: Marijuana      Comment: twice weekly    Sexual activity: Not Asked     Other Topics Concern    None     Social History Narrative    4/2016: lives with son and two grandchildren. Did 1 year of college. Used to work as . Family History   Problem Relation Age of Onset    Heart Disease Mother     Heart Disease Sister      Current Outpatient Prescriptions   Medication Sig Dispense Refill    magnesium oxide (MAG-OX) 400 mg tablet       potassium chloride SR (KLOR-CON 10) 10 mEq tablet       MILRINONE LACTATE (MILRINONE IV) 0.25 mcg/kg/min by Peripherally Inserted Central Catheter route.  atorvastatin (LIPITOR) 40 mg tablet TK 1 T PO HS  3    pantoprazole (PROTONIX) 40 mg tablet TK 1 T PO BEFORE BREAKFAST  3    aspirin delayed-release 81 mg tablet       furosemide (LASIX) 40 mg tablet Take 40 mg by mouth two (2) times a day.  multivitamin (ONE A DAY) tablet Take 1 Tab by mouth daily.  tamsulosin (FLOMAX) 0.4 mg capsule TAKE ONE CAPSULE BY MOUTH DAILY 90 Cap 3    acetaminophen (TYLENOL ARTHRITIS PAIN) 650 mg CR tablet Take 1 Tab by mouth daily. Indications: ARTHRITIC PAIN 30 Tab 11    potassium chloride (K-DUR, KLOR-CON) 10 mEq tablet Take 1 Tab by mouth daily.  90 Tab 3     No Known Allergies    Objective:  Visit Vitals    /58 (BP 1 Location: Left arm, BP Patient Position: Sitting)    Pulse 88    Temp 96.8 °F (36 °C) (Oral)    Resp 18    Ht 5' 9\" (1.753 m)    Wt 166 lb (75.3 kg)    SpO2 99%    BMI 24.51 kg/m2     Wt Readings from Last 3 Encounters:   02/14/17 166 lb (75.3 kg)   02/10/17 167 lb 3.2 oz (75.8 kg)   17 169 lb (76.7 kg)     Physical Exam:   General appearance - alert, well appearing, and in no acute distress. Mental status - A/O x 4, normal mood and affect. Neck -Supple ,normal CSP. FROM, non-tender. No significant adenopathy/thyromegaly. No JVD. Chest - CTA. Symmetric chest rise. No wheezing, rales or rhonchi. +MCCALLUM. Heart - Normal rate, regular rhythm. Normal S1, S2. No MGR or clicks. +pacemaker. Abdomen - Soft,non-distended. Normoactive BS in all quadrants. NT, no mass or HSM. Ext- Radial, DP pulses, 2+ bilaterally. No pedal edema, clubbing, or cyanosis. Right arm PICC (milrinone drip)  Skin-Warm and dry. No hyperpigmentation, ulcerations, or suspicious lesions. Neuro - Normal speech, no focal findings or movement disorder. Normal strength, gait, and muscle tone. Assessment/Plan:  See below for other orders   Follow-up Disposition:  Return in about 4 weeks (around 3/14/2017) for 1 month f/u. ICD-10-CM ICD-9-CM    1. NYHA class 4 heart failure with borderline preserved ejection fraction (HCC) I50.9 428.9    2. Pulmonary hypertension (HCC) I27.2 416.8    3. Acute on chronic systolic congestive heart failure (HCC) I50.23 428.23      428.0    4. SOB (shortness of breath) R06.02 786.05    5. Pacemaker Z95.0 V45.01      Orders Placed This Encounter    magnesium oxide (MAG-OX) 400 mg tablet    potassium chloride SR (KLOR-CON 10) 10 mEq tablet    MILRINONE LACTATE (MILRINONE IV)     Si.25 mcg/kg/min by Peripherally Inserted Central Catheter route. Pattricia Angles expressed understanding of plan. An After Visit Summary was offered/printed and given to the patient.

## 2017-02-14 NOTE — PATIENT INSTRUCTIONS
Milrinone (By injection)   Milrinone (MIL-ri-none)  Treats heart failure. Brand Name(s):   There may be other brand names for this medicine. When This Medicine Should Not Be Used: You should not use this medicine if you have ever had an allergic reaction to milrinone or amrinone. How to Use This Medicine:   Injectable  · This is a very strong medicine. Make sure you understand why your doctor has ordered it for you and what the possible risks and benefits of the treatment are. · An IV is medicine that is put directly into your body through one of your veins. · Your doctor will prescribe your exact dose. Use this medicine exactly as your doctor has ordered. · This medicine should be given by a person who is trained to give IV medicine, such as a nurse. Sometimes you, a family member, or a friend can be taught to give your medicine. · Milrinone is sometimes given very slowly. You may need to have the IV attached for several hours. If a dose is missed:   · Your doctor will decide how often your medicine should be given. Ask your doctor for instructions on what to do if you miss a dose. How to Store and Dispose of This Medicine:   · Store your medicine at room temperature, away from heat and direct light. Do not freeze. · Throw away any unused medicine after the expiration date has passed. · Keep all medicine out of the reach of children. Drugs and Foods to Avoid:   Ask your doctor or pharmacist before using any other medicine, including over-the-counter medicines, vitamins, and herbal products. · Make sure your doctor knows if you are also taking disopyramide. Warnings While Using This Medicine:   · If you are pregnant or breastfeeding, talk to your doctor before taking this medicine. · Before taking this medicine, tell your doctor if you have kidney problems or lung disease.   Possible Side Effects While Using This Medicine:   Call your doctor right away if you notice any of these side effects:  · Slow, fast, or irregular heartbeat  · Chest pain  If you notice these less serious side effects, talk with your doctor:   · Headaches  If you notice other side effects that you think are caused by this medicine, tell your doctor. Call your doctor for medical advice about side effects. You may report side effects to FDA at 2-062-YOP-8118  © 2016 [address] is for End User's use only and may not be sold, redistributed or otherwise used for commercial purposes. The above information is an  only. It is not intended as medical advice for individual conditions or treatments. Talk to your doctor, nurse or pharmacist before following any medical regimen to see if it is safe and effective for you.

## 2017-02-14 NOTE — Clinical Note
Napoleon Gonzalez, Dr. Wallace Farley  Currently on milrinone drip for CHF, followed by Dr. Jamaal Holland at [facility]. He may be an LVAD candidate as he is too old for a heart transplant. Have you had any LVAD patients? Do you do anything different with them? I told him, he may need to start seeing you if he gets the LVAD, as he would be a bit too complex for me.

## 2017-02-14 NOTE — PROGRESS NOTES
1. Have you been to the ER, urgent care clinic since your last visit? Hospitalized since your last visit? No    2. Have you seen or consulted any other health care providers outside of the [address] [place] [address] since your last visit? Include any pap smears or colon screening. No     Pt is here for   Chief Complaint   Patient presents with    Hypertension     follow up     Pt denies pain at this time. ...

## 2017-02-14 NOTE — MR AVS SNAPSHOT
Visit Information Date & Time Provider Department Dept. Phone Encounter #  
 2/14/2017  2:20 PM Patti Arellano NP [address] [facility] [phone] [id number] Follow-up Instructions Return in about 4 weeks (around 3/14/2017) for 1 month f/u. Your Appointments 10/4/2017  9:40 AM  
Follow Up with Jonh Carson MD  
Neurology Clinic at [facility] Appt Note: Follow up $0CP tdb 10/4/16  
 [facility], 
[address] [place] [address], [address] [phone]  
[address] [facility], [address] [place] [address], [address] [phone] Upcoming Health Maintenance Date Due  
 GLAUCOMA SCREENING Q2Y 2/16/2017* Pneumococcal 65+ Low/Medium Risk (1 of 2 - PCV13) 2/16/2017* MEDICARE YEARLY EXAM 1/18/2018 COLONOSCOPY 9/9/2020 DTaP/Tdap/Td series (2 - Td) 6/14/2026 *Topic was postponed. The date shown is not the original due date. Allergies as of 2/14/2017  Review Complete On: 1/17/2017 By: Patti Arellano NP No Known Allergies Current Immunizations  Reviewed on 1/17/2017 Name Date Influenza High Dose Vaccine PF 1/17/2017, 12/1/2014 Influenza Vaccine 10/24/2013 Tdap 6/14/2016 Not reviewed this visit You Were Diagnosed With   
  
 Codes Comments NYHA class 4 heart failure with borderline preserved ejection fraction ([facility] [address]    -  Primary ICD-10-CM: I50.9 ICD-9-CM: 428.9 Pulmonary hypertension ([facility] [address]     ICD-10-CM: I27.2 ICD-9-CM: 416.8 Acute on chronic systolic congestive heart failure (HCC)     ICD-10-CM: S09.27 ICD-9-CM: 428.23, 428.0 SOB (shortness of breath)     ICD-10-CM: R06.02 
ICD-9-CM: 786.05 Pacemaker     ICD-10-CM: Z95.0 ICD-9-CM: V45.01 Vitals BP Pulse Temp Resp Height(growth percentile) Weight(growth percentile) 103/58 (BP 1 Location: Left arm, BP Patient Position: Sitting) 88 96.8 °F (36 °C) (Oral) 18 5' 9\" (1.753 m) 166 lb (75.3 kg) SpO2 BMI Smoking Status 99% 24.51 kg/m2 Former Smoker Vitals History BMI and BSA Data Body Mass Index Body Surface Area 24.51 kg/m 2 1.91 m 2 Preferred Pharmacy Pharmacy Name Phone [phone] [facility] [place] [phone] Your Updated Medication List  
  
   
This list is accurate as of: 2/14/17  3:43 PM.  Always use your most recent med list.  
  
  
  
  
 acetaminophen 650 mg CR tablet Commonly known as:  TYLENOL ARTHRITIS PAIN Take 1 Tab by mouth daily. Indications: ARTHRITIC PAIN  
  
 aspirin delayed-release 81 mg tablet  
  
 atorvastatin 40 mg tablet Commonly known as:  LIPITOR TK 1 T PO HS  
  
 furosemide 40 mg tablet Commonly known as:  LASIX Take 40 mg by mouth two (2) times a day. magnesium oxide 400 mg tablet Commonly known as:  MAG-OX MILRINONE IV  
0.25 mcg/kg/min by Peripherally Inserted Central Catheter route. multivitamin tablet Commonly known as:  ONE A DAY Take 1 Tab by mouth daily. pantoprazole 40 mg tablet Commonly known as:  PROTONIX TK 1 T PO BEFORE BREAKFAST * potassium chloride 10 mEq tablet Commonly known as:  K-DUR, KLOR-CON Take 1 Tab by mouth daily. * potassium chloride SR 10 mEq tablet Commonly known as:  KLOR-CON 10  
  
 tamsulosin 0.4 mg capsule Commonly known as:  FLOMAX TAKE ONE CAPSULE BY MOUTH DAILY * Notice: This list has 2 medication(s) that are the same as other medications prescribed for you. Read the directions carefully, and ask your doctor or other care provider to review them with you. Follow-up Instructions Return in about 4 weeks (around 3/14/2017) for 1 month f/u. Patient Instructions Milrinone (By injection) Milrinone (MIL-ri-none) Treats heart failure. Brand Name(s):  
There may be other brand names for this medicine. When This Medicine Should Not Be Used: You should not use this medicine if you have ever had an allergic reaction to milrinone or amrinone. How to Use This Medicine:  
Injectable · This is a very strong medicine. Make sure you understand why your doctor has ordered it for you and what the possible risks and benefits of the treatment are. · An IV is medicine that is put directly into your body through one of your veins. · Your doctor will prescribe your exact dose. Use this medicine exactly as your doctor has ordered. · This medicine should be given by a person who is trained to give IV medicine, such as a nurse. Sometimes you, a family member, or a friend can be taught to give your medicine. · Milrinone is sometimes given very slowly. You may need to have the IV attached for several hours. If a dose is missed:  
· Your doctor will decide how often your medicine should be given. Ask your doctor for instructions on what to do if you miss a dose. How to Store and Dispose of This Medicine: · Store your medicine at room temperature, away from heat and direct light. Do not freeze. · Throw away any unused medicine after the expiration date has passed. · Keep all medicine out of the reach of children. Drugs and Foods to Avoid: Ask your doctor or pharmacist before using any other medicine, including over-the-counter medicines, vitamins, and herbal products. · Make sure your doctor knows if you are also taking disopyramide. Warnings While Using This Medicine: · If you are pregnant or breastfeeding, talk to your doctor before taking this medicine. · Before taking this medicine, tell your doctor if you have kidney problems or lung disease. Possible Side Effects While Using This Medicine:  
Call your doctor right away if you notice any of these side effects: · Slow, fast, or irregular heartbeat · Chest pain If you notice these less serious side effects, talk with your doctor:  
· Headaches If you notice other side effects that you think are caused by this medicine, tell your doctor. Call your doctor for medical advice about side effects. You may report side effects to FDA at 9-922-ZTY-9783 © 2016 [address] is for End User's use only and may not be sold, redistributed or otherwise used for commercial purposes. The above information is an  only. It is not intended as medical advice for individual conditions or treatments. Talk to your doctor, nurse or pharmacist before following any medical regimen to see if it is safe and effective for you. Introducing [facility]! Piyush Esteves introduces [employer] patient portal. Now you can access parts of your medical record, email your doctor's office, and request medication refills online. 1. In your internet browser, go to [email or web address] 2. Click on the First Time User? Click Here link in the Sign In box. You will see the New Member Sign Up page. 3. Enter your [employer] Access Code exactly as it appears below. You will not need to use this code after youve completed the sign-up process. If you do not sign up before the expiration date, you must request a new code. · [employer] Access Code: [email or web address] Expires: 5/11/2017  4:42 PM 
 
4. Enter the last four digits of your Social Security Number (xxxx) and Date of Birth (mm/dd/yyyy) as indicated and click Submit. You will be taken to the next sign-up page. 5. Create a [employer] ID. This will be your [employer] login ID and cannot be changed, so think of one that is secure and easy to remember. 6. Create a [employer] password. You can change your password at any time. 7. Enter your Password Reset Question and Answer. This can be used at a later time if you forget your password. 8. Enter your e-mail address. You will receive e-mail notification when new information is available in [address] 9. Click Sign Up. You can now view and download portions of your medical record. 10. Click the Download Summary menu link to download a portable copy of your medical information. If you have questions, please visit the Frequently Asked Questions section of the [employer] website. Remember, [employer] is NOT to be used for urgent needs. For medical emergencies, dial 911. Now available from your iPhone and Android! Please provide this summary of care documentation to your next provider. Your primary care clinician is listed as CLAIRE Farias. If you have any questions after today's visit, please call [phone].

## 2017-02-28 DIAGNOSIS — M79.89 SWELLING OF BOTH HANDS: ICD-10-CM

## 2017-02-28 DIAGNOSIS — R25.2 CRAMP OF BOTH LOWER EXTREMITIES: ICD-10-CM

## 2017-02-28 RX ORDER — POTASSIUM CHLORIDE 750 MG/1
10 TABLET, EXTENDED RELEASE ORAL DAILY
Qty: 90 TAB | Refills: 3 | OUTPATIENT
Start: 2017-02-28

## 2017-03-14 ENCOUNTER — PATIENT OUTREACH (OUTPATIENT)
Dept: INTERNAL MEDICINE CLINIC | Age: 77
End: 2017-03-14

## 2017-03-14 NOTE — PROGRESS NOTES
This writer has attempted to follow up with patient who is listed on discharge [facility]) report dated 1/12/17. Patient was discharged from [facility] for [address] Call goes unanswered, lmom with contact number requesting return call.

## 2017-07-19 ENCOUNTER — PATIENT OUTREACH (OUTPATIENT)
Dept: INTERNAL MEDICINE CLINIC | Age: 77
End: 2017-07-19

## 2017-09-07 ENCOUNTER — DOCUMENTATION ONLY (OUTPATIENT)
Dept: ORTHOPEDIC SURGERY | Age: 77
End: 2017-09-07

## 2017-11-07 DIAGNOSIS — N40.1 BENIGN PROSTATIC HYPERPLASIA WITH LOWER URINARY TRACT SYMPTOMS: ICD-10-CM

## 2017-11-07 RX ORDER — TAMSULOSIN HYDROCHLORIDE 0.4 MG/1
CAPSULE ORAL
Qty: 90 CAP | Refills: 0 | Status: SHIPPED | OUTPATIENT
Start: 2017-11-07

## 2017-11-09 NOTE — TELEPHONE ENCOUNTER
Pt was called today 11/9/17 ref to rx being filled an needing fu appt.  Pt states he had a heart transplant and  He now has new pcp at [facility]

## 2023-09-08 NOTE — PROGRESS NOTES
1926: Bedside shift change report given to Daniel Parmar (oncoming nurse) by Nela Zeng (offgoing nurse). Report included the following information SBAR, Kardex, ED Summary, Intake/Output, MAR, Recent Results and Cardiac Rhythm SR, PVCs. 2034: Notified by MT pt had 8 beat run of VTach. Pt sleeping. Asymptomatic. Denies pain or discomfort. Dr. Reilly Ram on unit and notified. MD entered orders. 2116: Dr. Reilly Ram notified K+ 4.4. MD canceled 2nd dose of KCL PO.  0725: Bedside shift change report given to Chrystal Cherry (oncoming nurse) by Sameera (offgoing nurse). Report included the following information SBAR, Kardex, ED Summary, Intake/Output, MAR, Recent Results and Cardiac Rhythm SR, PVCs, 8 beat VTach last night. MD notified. done